# Patient Record
Sex: MALE | Race: WHITE | Employment: PART TIME | URBAN - NONMETROPOLITAN AREA
[De-identification: names, ages, dates, MRNs, and addresses within clinical notes are randomized per-mention and may not be internally consistent; named-entity substitution may affect disease eponyms.]

---
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
## 2021-02-01 DIAGNOSIS — R56.9 SEIZURES (HCC): ICD-10-CM

## 2021-02-02 RX ORDER — ZONISAMIDE 100 MG/1
100 CAPSULE ORAL 2 TIMES DAILY
Qty: 60 CAPSULE | Refills: 5 | Status: SHIPPED | OUTPATIENT
Start: 2021-02-02 | End: 2021-07-30 | Stop reason: SDUPTHER

## 2021-02-04 ENCOUNTER — OFFICE VISIT (OUTPATIENT)
Dept: NEUROLOGY | Age: 21
End: 2021-02-04
Payer: COMMERCIAL

## 2021-02-04 ENCOUNTER — HOSPITAL ENCOUNTER (OUTPATIENT)
Dept: LAB | Age: 21
Discharge: HOME | End: 2021-02-04
Payer: COMMERCIAL

## 2021-02-04 VITALS
OXYGEN SATURATION: 97 % | DIASTOLIC BLOOD PRESSURE: 72 MMHG | RESPIRATION RATE: 18 BRPM | BODY MASS INDEX: 37.22 KG/M2 | HEART RATE: 108 BPM | WEIGHT: 260 LBS | SYSTOLIC BLOOD PRESSURE: 110 MMHG | HEIGHT: 70 IN

## 2021-02-04 DIAGNOSIS — R56.9 SEIZURES (HCC): ICD-10-CM

## 2021-02-04 DIAGNOSIS — G40.209 PARTIAL SYMPTOMATIC EPILEPSY WITH COMPLEX PARTIAL SEIZURES, NOT INTRACTABLE, WITHOUT STATUS EPILEPTICUS (HCC): Primary | ICD-10-CM

## 2021-02-04 DIAGNOSIS — G40.A09 ABSENCE SEIZURE (HCC): ICD-10-CM

## 2021-02-04 DIAGNOSIS — R56.00 FEBRILE SEIZURES (HCC): ICD-10-CM

## 2021-02-04 DIAGNOSIS — G40.909 NONINTRACTABLE EPILEPSY WITHOUT STATUS EPILEPTICUS, UNSPECIFIED EPILEPSY TYPE (HCC): ICD-10-CM

## 2021-02-04 DIAGNOSIS — F81.9 LEARNING DIFFICULTY: ICD-10-CM

## 2021-02-04 DIAGNOSIS — R94.01 ABNORMAL EEG: ICD-10-CM

## 2021-02-04 DIAGNOSIS — F90.0 ATTENTION DEFICIT HYPERACTIVITY DISORDER (ADHD), PREDOMINANTLY INATTENTIVE TYPE: ICD-10-CM

## 2021-02-04 DIAGNOSIS — G40.409 TONIC CLONIC SEIZURES (HCC): ICD-10-CM

## 2021-02-04 LAB
ANION GAP SERPL CALCULATED.3IONS-SCNC: 9 MMOL/L (ref 9–17)
CHLORIDE BLD-SCNC: 103 MMOL/L (ref 98–107)
CO2: 27 MMOL/L (ref 20–31)
HCT VFR BLD CALC: 40.8 % (ref 40.7–50.3)
HEMOGLOBIN: 13.3 G/DL (ref 13–17)
MCH RBC QN AUTO: 28.9 PG (ref 25.2–33.5)
MCHC RBC AUTO-ENTMCNC: 32.6 G/DL (ref 25.2–33.5)
MCV RBC AUTO: 88.7 FL (ref 82.6–102.9)
NRBC AUTOMATED: 0 PER 100 WBC
PDW BLD-RTO: 13.7 % (ref 11.8–14.4)
PLATELET # BLD: 236 K/UL (ref 138–453)
PMV BLD AUTO: 10 FL (ref 8.1–13.5)
POTASSIUM SERPL-SCNC: 4.2 MMOL/L (ref 3.7–5.3)
RBC # BLD: 4.6 M/UL (ref 4.21–5.77)
SODIUM BLD-SCNC: 139 MMOL/L (ref 135–144)
VALPROIC ACID LEVEL: 54 UG/ML (ref 50–125)
VALPROIC DATE LAST DOSE: NORMAL
VALPROIC DOSE AMOUNT: NORMAL
VALPROIC TIME LAST DOSE: 630
WBC # BLD: 11.3 K/UL (ref 4.5–13.5)

## 2021-02-04 PROCEDURE — 36415 COLL VENOUS BLD VENIPUNCTURE: CPT

## 2021-02-04 PROCEDURE — 80164 ASSAY DIPROPYLACETIC ACD TOT: CPT

## 2021-02-04 PROCEDURE — 80051 ELECTROLYTE PANEL: CPT

## 2021-02-04 PROCEDURE — 85027 COMPLETE CBC AUTOMATED: CPT

## 2021-02-04 PROCEDURE — 1036F TOBACCO NON-USER: CPT | Performed by: PSYCHIATRY & NEUROLOGY

## 2021-02-04 PROCEDURE — 99214 OFFICE O/P EST MOD 30 MIN: CPT

## 2021-02-04 PROCEDURE — G8484 FLU IMMUNIZE NO ADMIN: HCPCS | Performed by: PSYCHIATRY & NEUROLOGY

## 2021-02-04 PROCEDURE — G8417 CALC BMI ABV UP PARAM F/U: HCPCS | Performed by: PSYCHIATRY & NEUROLOGY

## 2021-02-04 PROCEDURE — 99215 OFFICE O/P EST HI 40 MIN: CPT | Performed by: PSYCHIATRY & NEUROLOGY

## 2021-02-04 PROCEDURE — G8427 DOCREV CUR MEDS BY ELIG CLIN: HCPCS | Performed by: PSYCHIATRY & NEUROLOGY

## 2021-02-04 RX ORDER — DIVALPROEX SODIUM 500 MG/1
TABLET, DELAYED RELEASE ORAL
Qty: 60 TABLET | Refills: 5 | Status: SHIPPED | OUTPATIENT
Start: 2021-02-04 | End: 2021-08-06 | Stop reason: SDUPTHER

## 2021-02-04 ASSESSMENT — ENCOUNTER SYMPTOMS
VOMITING: 0
CHANGE IN BOWEL HABIT: 0
EYES NEGATIVE: 1
COUGH: 0
ALLERGIC/IMMUNOLOGIC NEGATIVE: 1
RESPIRATORY NEGATIVE: 1
VISUAL CHANGE: 0
SWOLLEN GLANDS: 0
SORE THROAT: 0
NAUSEA: 0
DIARRHEA: 0
GASTROINTESTINAL NEGATIVE: 1
ABDOMINAL PAIN: 0

## 2021-02-04 NOTE — PROGRESS NOTES
Subjective:      Patient ID: Candy Moscoso is a 24 y.o. RIGHT   HANDED   . Seizures  This is a chronic problem. Episode onset: IN  CHILD [facility]AND[place]. The problem has been resolved. Pertinent negatives include no abdominal pain, anorexia, arthralgias, change in bowel habit, chest pain, chills, congestion, coughing, diaphoresis, fatigue, fever, headaches, joint swelling, myalgias, nausea, neck pain, numbness, rash, sore throat, swollen glands, urinary symptoms, vertigo, visual change, vomiting or weakness. Nothing aggravates the symptoms. Treatments tried: ANTI  EPILEPTIC MEDICATIONS. The treatment provided significant relief. History obtained from  The patient and  HIS  MOTHER       and other  available medical records were  Also  reviewed.               1)      H/O  KNOWN  CHILDHOOD ONSET  EPILEPSY                           -   STABLE          2)       PREVIOUS    H/O    FEBRILE SEIZURES                       -  NO  RECURRENCE                3)        PATIENT'S    SEIZURE  TYPES  INCLUDE :                  GTC  SEIZURES,  STARING  EPISODES,                 ABSENCE  AND  PARTIAL  COMPLEX  SEIZURES                      -     WELL  CONTROLLED                4)       FAMILY   H/O  SEIZURE  DISORDER/  EPILEPSY          5)        H/O  ADHD     -   STABLE                PATIENT  TO  FOLLOW  WITH  MENTAL  HEALTH  PROFESSIONALS              6)     H/O   LEARNING  DIFFICULTY                      -  IMPROVED                    PATIENT   COMPLETED       12  TH  GRADE                            VOCATIONAL  EDUCATION          7)       PREVIOUS  H/O  BEHAVIORAL  PROBLEMS                    -  WELL  CONTROLLED          8)     LAST  SEIZURE  ACTIVITY   REPORTED  TO  BE  IN  December 2014          9)   CURRENTLY   ON  DEPAKOTE  AND  ZONEGRAN             TOLERATING  THE SAME  WITHOUT  ANY  SIDE  EFFECTS            10)         PATIENT  NEUROLOGICALLY   STABLE                      WITHOUT  ANY  RECURRENCE  OF SEIZURE ACTIVITY. NO  MEMORY PROBLEMS. NO  CONFUSIONAL EPISODES. NO   STARING  EPISODES          11)       EEG   IN  December 2017    SHOWED                   NO  EPILEPTIFORM  FEATURES. 12)       PATIENT     GOT     DRIVERS  LICENSE   PERMIT  IN    8/87/1698                      PATIENT      SHOULD   CONTINUE  :                    A)    ANTI  EPILEPTIC  MEDICATIONS  PROPERLY,                     B)    FOLLOW  SEIZURE  PRECAUTIONS                         -   DISCUSSED   WITH   PATIENT  AND  HIS  MOTHER                                     13)     PATIENT  AND  HIS  MOTHER   DENIES  ANY                     SEIZURE   RECURRENCE      AND   ANY    NEW                         NEUROLOGICAL    CONCERNS. The  Duration,  Quality,  Severity,  Location,  Timing,  Context,  Modifying  Factors   Of   The   Chief   Complaint       And  Present  Illness   Was   Reviewed   In   Chronological   [facility].                                        Patient   Indicates   The  Presence   And  The  Absence  Of  The  Following  Associated  And         Additional  Neurological    Symptoms:                                Balance  And coordination problems  absent           Gait problems     absent            Headaches      absent              Migraines           absent           Memory problems        Absent             Confusion        absent            Paresthesia numbness          absent           Seizures  And  Starring  Episodes           present           Syncope,  Near  syncopal episodes         absent           Speech problems           absent             Swallowing  Problems      absent            Dizziness,  Light headedness           absent                        Vertigo        absent             Generalized   Weakness    absent              focal  Weakness     absent             Tremors         absent              Sleep  Problems     absent History  Of   Recent   Head  Injury     absent             History  Of   Recent  TIA     absent             History  Of   Recent    Stroke     absent             Neck  Pain and  Neck muscle  Spasms  Absent               Radiating  down   And   Weakness           absent            Lower back   Pain  And     Spasms  Absent              Radiating    Down   And   Weakness          absent                H/O   FALLS        absent               History  Of   Visual  Symptoms    Absent                  Associated   Diplopia       absent                                    Also   Additional   Symptoms   Present    As  Documented    In   The detailed      Review  Of  Systems   And    Please   Refer   To    Them for   Additional  Information. Any components  That are either  Unobtainable  Or  Limited  In   HPI, ROS  And/or PFSH   Are       Due   To   Patient's  Medical  Problems,  Clinical  Condition and/or lack of other  Alternate resources.             RECORDS   REVIEWED:    historical medical records, lab reports, office notes and radiology reports           INFORMATION   REVIEWED:     MEDICAL   HISTORY,     SURGICAL   HISTORY,   MEDICATIONS   LIST,   ALLERGIES AND  DRUG  INTOLERANCES,     FAMILY   HISTORY,  SOCIAL  HISTORY,    PROBLEM  LIST   FOR  PATIENT  CARE   COORDINATION             Past Medical History:   Diagnosis Date    Abnormal EEG     [facility]     Attention deficit hyperactivity disorder     Behavioral problems     Complex partial [facility]ptic seizure [facility] [facility]     Epile[facility] [facility]     Febrile seizures ([facility] [address] [facility] last seizure in July 2010    Learning difficulty     Tonic clonic seizures ([facility] [address] [facility]                   Past Surgical History:   Procedure Laterality Date    TONSILLECTOMY AND ADENOIDECTOMY  2003                 Current Outpatient Medications   Medication Sig Dispense Refill    vitamin D (CHOLECALCIFEROL) 25 MCG (1000 UT) TABS tablet Take 1,000 Units by mouth daily      divalproex (DEPAKOTE) 500 MG DR tablet ONE TABLET     TWICE  DAILY 60 tablet 5    zonisamide (ZONEGRAN) 100 MG capsule Take 1 capsule by mouth 2 times daily 60 capsule 5    Multiple Vitamins-Minerals (MULTIVITAL) CHEW Take  by mouth daily.  loratadine (CLARITIN) 10 MG tablet Take 10 mg by mouth as needed. No current facility-administered medications for this visit.             No Known Allergies            Family History   Problem Relation Age of Onset    Depression Mother     Asthma Mother     Neuropathy Mother     Mental Illness Mother     Seizures Neg Hx              Social History     Socioeconomic History    Marital status: Single     Spouse name: Not on file    Number of children: Not on file    Years of education: Not on file    Highest education level: Not on file   Occupational History    Not on file   Social Needs    Financial resource strain: Not on file    Food insecurity     Worry: Not on file     Inability: Not on file    Transportation needs     Medical: Not on file     Non-medical: Not on file   Tobacco Use    Smoking status: Never Smoker    Smokeless tobacco: Never Used   Substance and Sexual Activity    Alcohol use: No     Alcohol/week: 0.0 standard drinks    Drug use: Never    Sexual activity: Not on file   Lifestyle    Physical activity     Days per week: Not on file     Minutes per session: Not on file    Stress: Not on file   Relationships    Social connections     Talks on phone: Not on file     Gets together: Not on file     Attends Taoism service: Not on file     Active member of club or organization: Not on file     Attends meetings of clubs or organizations: Not on file     Relationship status: Not on file    Intimate partner violence     Fear of current or ex partner: Not on file     Emotionally abused: Not on file     Physically abused: Not on file     Forced sexual activity: Not on file   Other Topics Concern    Not on file   Social History Narrative    Not on file       Vitals:    02/04/21 1554   BP: 110/72   Pulse: (!) 108   Resp: 18   SpO2: 97%         Wt Readings from Last 3 Encounters:   02/04/21 (!) 260 lb (117.9 kg) (>99 %, Z= 2.57)*   07/24/20 (!) 243 lb 12.8 oz (110.6 kg) (>99 %, Z= 2.34)*   11/07/19 (!) 231 lb 6.4 oz (105 kg) (99 %, Z= 2.20)*     * Growth percentiles are based on [facility] (Boys, 2-20 Years) data. BP Readings from Last 3 Encounters:   02/04/21 110/72   07/24/20 124/82   11/07/19 120/74 (53 %, Z = 0.07 /  68 %, Z = 0.47)*     *BP percentiles are based on the 2017 AAP Clinical Practice Guideline for boys       Hematology and Coagulation  Lab Results   Component Value Date    WBC 9.5 07/24/2020    RBC 4.76 07/24/2020    HGB 13.9 07/24/2020    HCT 42.4 07/24/2020    MCV 89.1 07/24/2020    MCH 29.2 07/24/2020    MCHC 32.8 07/24/2020    RDW 13.4 07/24/2020     07/24/2020    MPV 10.6 07/24/2020       Chemistries  Lab Results   Component Value Date     07/24/2020    K 4.5 07/24/2020     07/24/2020    CO2 22 07/24/2020    BUN 16 10/16/2018    CREATININE 1.09 10/16/2018    AST 22 03/05/2019    ALT 31 03/05/2019     Lab Results   Component Value Date    ALT 31 03/05/2019    AST 22 03/05/2019     Lab Results   Component Value Date    AST 22 03/05/2019    ALT 31 03/05/2019     Lab Results   Component Value Date    MTQAXOEN11 1066 09/29/2016         Drug Levels  Lab Results   Component Value Date    VALPROATE 62 07/24/2020    VALPROATE 58 08/05/2019           Review of Systems   Constitutional: Negative for chills, diaphoresis, fatigue and fever. HENT: Negative. Negative for congestion and sore throat. Eyes: Negative. Respiratory: Negative. Negative for cough. Cardiovascular: Negative. Negative for chest pain and palpitations. Gastrointestinal: Negative. Negative for abdominal pain, anorexia, change in bowel habit, diarrhea, nausea and vomiting. Endocrine: Negative. Genitourinary: Negative. Musculoskeletal: Negative. Negative for arthralgias, joint swelling, myalgias and neck pain. Skin: Negative. Negative for rash. Allergic/Immunologic: Negative. Neurological: Positive for seizures. Negative for dizziness, vertigo, tremors, syncope, facial asymmetry, speech difficulty, weakness, light-headedness, numbness and headaches. Hematological: Negative. Psychiatric/Behavioral: Positive for behavioral problems and decreased concentration. Negative for agitation, confusion, dysphoric mood, hallucinations, self-injury and suicidal ideas. The patient is not nervous/anxious. Objective:   Physical Exam  Constitutional:       Appearance: He is well-developed. HENT:      Head: Normocephalic and atraumatic. No raccoon eyes or Calixto's sign. Right Ear: External ear normal.      Left Ear: External ear normal.      Nose: Nose normal.   Eyes:      Conjunctiva/sclera: Conjunctivae normal.      Pupils: Pupils are equal, round, and reactive to light. Neck:      Musculoskeletal: Normal range of motion and neck supple. Normal range of motion. No neck rigidity or muscular tenderness. Thyroid: No thyroid mass or thyromegaly. Vascular: No carotid bruit. Trachea: No tracheal deviation. Meningeal: Brudzinski's sign and Kernig's sign absent. Cardiovascular:      Rate and Rhythm: Normal rate and regular rhythm. Pulmonary:      Effort: Pulmonary effort is normal.   Musculoskeletal:         General: No tenderness. Skin:     General: Skin is warm. Coloration: Skin is not pale. Findings: No erythema or rash. Nails: There is no clubbing. Psychiatric:         Attention and Perception: He is attentive. Mood and Affect: Mood is not anxious or depressed. Affect is not labile, blunt or inappropriate. Speech: He is communicative.  Speech is not rapid and pressured, delayed, slurred or tangential.         Behavior: Behavior is not agitated, slowed, aggressive, withdrawn, hyperactive or combative. Behavior is cooperative. Thought Content: Thought content is not paranoid or delusional. Thought content does not include homicidal or suicidal ideation. Thought content does not include homicidal or suicidal plan. Cognition and Memory: Memory is not impaired. He does not exhibit impaired recent memory or impaired remote memory. Judgment: Judgment is not impulsive or inappropriate. NEUROLOGICAL EXAMINATION :    A) MENTAL STATUS:                   Alert and  oriented  To time, place  And  Person. No Aphasia. No  Dysarthria. Able   To  Follow   SIMPLE      commands without   Any  Difficulty. No right  To left confusion. Normal  Speech  And language function. Insight and  Judgment ,Fund  Of  Knowledge   within normal limits                Recent  And  Remote memory  within normal limits                Attention &Concentration are within normal limits                                                     B) CRANIAL NERVES :             2 CN : Visual  Acuity  And  Visual fields  within normal limits                        Fundi  Could  Not  Be  Could  Not  Be  Evaluated. 3,4,6 CN : Both  Pupils are   Reactive and  Equal.                            Extraocular   Movements  Are  Intact. No  Nystagmus. No  DELMY. No  Afferent  Pupillary  Defect noted. 5 CN :  Normal  Facial sensations and Corneal  Reflexes           7 CN :  Normal  Facial  Symmetry  And  Strength. No facial  Weakness.            8 CN :  Hearing  Appears within normal limits          9, 10 CN: Normal spontaneous, reflex palate movements         11 CN:   Normal  Shoulder shrug and  Strength         12 CN :   Normal  Tongue movements and  Tongue  In midline                        No tongue   Fasciculations or atrophy           C) MOTOR  EXAM: Strength  In upper  And  Lower extremities   within normal limits               No  Drift. No  Atrophy               Rapid alternating  And  repetitions  Movements  within normal limits               Muscle  Tone  In upper  And  Lower  Extremities  Normal                No rigidity. No  Spasticity. Bradykinesia   Absent                 No  Asterixis. Sustention  Tremor , Resting  Tremor   absent                    No other  Abnormal  Movements noted           D) SENSORY :               light touch, pinprick, position  And  Vibration  within normal limits        E) REFLEXES:                   Deep  Tendon  Reflexes normal                    No pathological  Reflexes  Bilaterally. F) COORDINATION  AND  GAIT :                                Station and  Gait  normal                                        Romberg's test negative                          Ataxia negative      Assessment:      Patient Active Problem List   Diagnosis    Attention deficit hyperactivity disorder    Febrile seizures ([address]    Abnormal EEG    Tonic clonic seizures ([address]    Behavioral problems    Epilepsy ([address]    Learning difficulty    Complex partial epileptic seizure ([address]    [facility]ence seizure [facility] [address]           Plan:           VISITING DIAGNOSIS:      ICD-10-CM    1. Partial symptomatic epilepsy with complex partial seizures, not intractable, without status epilepticus ([address]  G40.209    2. Seizures (HCC)  R56.9 divalproex (DEPAKOTE) 500 MG DR tablet     CBC     Electrolyte Panel     Valproic acid level, total   3. Learning difficulty  F81.9    4. Attention deficit hyperactivity disorder (ADHD), predominantly inattentive type  F90.0    5. Absence seizure ([address]  G40. A09    6. Nonintractable epilepsy without status epilepticus, unspecified epilepsy type ([address]  G40.909    7. Tonic clonic seizures (HCC)  G40.409    8. Abnormal EEG  R94.01    9.  Febrile seizures ([address] R56.00               CONCERNS   &   INCREASED   RISK   FOR           *  SEIZURE  ACTIVITY,  EPILEPSY           *        VARIOUS  RISK   FACTORS   WERE  REVIEWED   AND   DISCUSSED. *  PATIENT   HAS  MULTIPLE   MEDICAL, MENTAL HEALTH  &    NEUROLOGICAL   PROBLEMS . PATIENT'S   MANAGEMENT  IS  CHALLENGING. PLAN:       Jason Agarwal  Of  The  Diagnoses,  The  Management & Treatment  Options           AND    Care  plan  Were        Reviewed and   Discussed   With  patient and   HIS  MOTHER    . * Goals  And  Expectations  Of  The  Therapy  Discussed   And  Reviewed. *   Benefits   And   Side  Effect  Profile  Of  Medication/s   Were   Discussed             * Need   For  Further   Follow up For  The  Various  Problems  Were discussed. * Results  Of  The  Previous  Diagnostic tests were reviewed and questions answered. patient and father  understand the same. Medical  Decision  Making  Was  Made  Based on the   Complexity  Of  Above  Mentioned  Diagnoses,        Data reviewed   & diagnostic  Tests  Reviewed,  Risk  Of  Significant   Co morbidities and complicating   Factors. Medical  Decision  Was   High  Complexity  Due   To  The  Patient's  Multiple  Symptoms,  Advancing   Disease,      Complex  Treatment  Regimen,  Multiple medications and   Risk  Of   Side  Effects,  Difficulty  In  Medication  Management      And  Diagnostic  Challenges   In  View  Of  The  Associated   Co  Morbid  Conditions   And  Problems. *    SUPERVISED   CARE      *   BE  CAREFUL  WITH  ACTIVITIES             *   ADEQUATE   FLUID  INTAKE   AND  ELECTROLYTE  BALANCE             * KEEP  DAIRY  OF   THE  NEUROLOGICAL  SYMPTOMS        RECORDING THE    DURATION  AND  FREQUENCY. *  AVOID    CONDITIONS  AND  FACTORS   THAT  MAKE   NEUROLOGICAL  SYMPTOMS  WORSE. *   SEIZURE  PRECAUTIONS. A)  Avoid  Working  At   [employer] B)  Avoid  Working  With  Heavy machinery. C)  Avoid   Swimming,  Climbing  A  Ladder   Unattended. D)  Avoid   Driving   If  You   Have  A  Seizure. E)  Must   Be  Seizure  Free   For  At   Least   6 months,  Before   You  Can drive. F) Some times  Your  May  Feel  Seizure coming  Before  It  Begins. You  May feel               Strange smell or funny  Feeling  In  Your  Stomach,  Which is  Called   Aura. TIPS  TO  REDUCE/ PREVENT  SEIZURES         1. Take  Your  Anti seizure  Medications   As   Recommended. 2. Get   Enough   Sleep. Sleep  Deprivation   Can  Trigger  A  Seizure. 3. If   You   Have  A fever,  Treat  It  At  Once,  And  Contact   Your  Primary  Care Providers. 4. Avoid   Alcohol. 5. Avoid  Flashing  Lights,  Loud  Noises and  TV  And  Video  Games,             As   These  May  Trigger   Your  Seizures       6. Control  Your  Stress  And   Have  Adequate  Rest.       7.   If  You  Feel  A  Seizure  Coming   On :             A) warn people  Who  Are  With  You           B)  Make  Sure  There  Are  No  Sharp or  Hard  Objects  Around you. C)  Lay down  On  Your  Side  And  Relax. *  TO  MAINTAIN  REGULAR  SLEEP  WAKE  CYCLES. *   TO  HAVE  ADEQUATE  REST  AND   SLEEP    HOURS. *      WEIGHT   LOSS. *    AVOID  ANY USAGE OF                   TOBACCO,  EXCESSIVE  ALCOHOL  AND   ILLEGAL   SUBSTANCES          *  CONTINUE MEDICATIONS PRESCRIBED      AS    RECOMMENDED       *   Compliance   With  Medications   And  Instructions          * CURRENTLY  TOLERATING  THE  PRESCRIBED   MEDICATIONS.        WITHOUT  ANY  SIGNIFICANT  SIDE  EFFECTS &  GETTING BENEFIT. *  May   Use  Pill  Box,    If  Needed        *  MEDICATIONS TO AVOID  :    WELLBUTRIN,  ULTRAM         *    Prophylactic  Use   Of     Vitamin   B   Complex,  Folic  Acid,    Vitamin  B12        Multivitamin   Tablet  Daily    Supplementations   Over  The  Counter  Discussed           *  PATIENT  IS  ALSO   COUNSELED   TO  KEEP    ACTIVITIES         A)   SIMPLE      B)  ORGANIZED      C)  WRITE   DOWN                      Orders Placed This Encounter   Procedures    CBC    Electrolyte Panel    Valproic acid level, total       Orders Placed This Encounter   Medications    divalproex (DEPAKOTE) 500 MG DR tablet     Sig: ONE TABLET     TWICE  DAILY     Dispense:  60 tablet     Refill:  5                   *PATIENT   TO  FOLLOW  UP  WITH   PRIMARY  CARE   AND   OTHER  CONSULTANTS  AS  BEFORE.           *TO  FOLLOW  WITH   MENTAL  HEALTH  PROFESSIONALS ,  INCLUDING            PSYCHOLOGICAL  COUNSELING   AND  PSYCHIATRIC  EVALUTIONS            *  Maintain   Healthy  Life Style    With   Periodic  Monitoring  Of      Any  Medical  Conditions  Including   Elevated  Blood  Pressure,  Lipid  Profile,     Blood  Sugar levels  And   Heart  Disease. *   Period   Screening  For  Cancers  Involving  Breast,  Colon,    Prostate, lungs  And  Other  Organs  As  Applicable,  In consultation   With  Your  Primary Care Providers. * Second  Neurological  Opinion  And  Evaluations  In  [facility] AND [facility]  Setting  If  Patient  Is  Interested.                   *  If  The  Patient remains  Neurologically  Stable   Return   To  [facility] Neurology Department       IN  3-6   MONTHS  TIME   FOR  FURTHER  FOLLOW UP.                   *  If   There is  Any  Significant  Worsening   Of  Current  Symptoms  And  Or  If patient  Develops       Any additional  New  Neurological  Symptoms  Or  Significant  Concerns   Should  Call  911 or      Go  To  Emergency Department  For  Further  Immediate  Evaluation. *   The  Neurological   Findings,  Possible  Diagnosis,  Differential diagnoses   And  Options      For    Further   Investigations   And  management   Are  Discussed  Comprehensively. Medications   And  Prescription   Risks  And  Side effects  Are   Also  Discussed. The  Above  Were  Reviewed  With  patient and   HIS  MOTHER    And     questions  Answered  In  Detail. More   Than   50% of face  To face Time   Was  Spent  On  Counseling   And   Coordination  Of  Care       Of   Patient's multiple   Neurological  Problems   And   Comorbid  Medical   Conditions. Electronically signed by Shannan Casillas MD.  [facility]      Board Certified in  Neurology &  In  [facility] of Psychiatry and Neurology ([employer])      DISCLAIMER:   Although every effort was made to ensure the accuracy of this  electronic transcription, some errors in transcription may have occurred. GENERAL PATIENT INSTRUCTIONS:     A Healthy Lifestyle: Care Instructions  Your Care Instructions  A healthy lifestyle can help you feel good, stay at a healthy weight, and have plenty of energy for both work and play. A healthy lifestyle is something you can share with your whole family. A healthy lifestyle also can lower your risk for serious health problems, such as high blood pressure, heart disease, and diabetes. You can follow a few steps listed below to improve your health and the health of your family. Follow-up care is a key part of your treatment and safety. Be sure to make and go to all appointments, and call your doctor if you are having problems. Its also a good idea to know your test results and keep a list of the medicines you take. How can you care for yourself at home? Do not eat too much sugar, fat, or fast foods. You can still have dessert and treats now and then. The goal is moderation.   Start small to improve your eating habits. Pay attention to portion sizes, drink less juice and soda pop, and eat more fruits and vegetables. Eat a healthy amount of food. A 3-ounce serving of meat, for example, is about the size of a deck of cards. Fill the rest of your plate with vegetables and whole grains. Limit the amount of soda and sports drinks you have every day. Drink more water when you are thirsty. Eat at least 5 servings of fruits and vegetables every day. It may seem like a lot, but it is not hard to reach this goal. A serving or helping is 1 piece of fruit, 1 cup of vegetables, or 2 cups of leafy, raw vegetables. Have an apple or some carrot sticks as an afternoon snack instead of a candy bar. Try to have fruits and/or vegetables at every meal.  Make exercise part of your daily routine. You may want to start with simple activities, such as walking, bicycling, or slow swimming. Try to be active 30 to 60 minutes every day. You do not need to do all 30 to 60 minutes all at once. For example, you can exercise 3 times a day for 10 or 20 minutes. Moderate exercise is safe for most people, but it is always a good idea to talk to your doctor before starting an exercise program.  Keep moving. Jerilyn Antu the lawn, work in the garden, or [employer]. Take the stairs instead of the elevator at work. If you smoke, quit. People who smoke have an increased risk for heart attack, stroke, cancer, and other lung illnesses. Quitting is hard, but there are ways to boost your chance of quitting tobacco for good. Use nicotine gum, patches, or lozenges. Ask your doctor about stop-smoking programs and medicines. Keep trying. In addition to reducing your risk of diseases in the future, you will notice some benefits soon after you stop using tobacco. If you have shortness of breath or asthma symptoms, they will likely get better within a few weeks after you quit. Limit how much alcohol you drink.  Moderate amounts of alcohol (up to 2 drinks a day for men, 1 drink a day for women) are okay. But drinking too much can lead to liver problems, high blood pressure, and other health problems. Family health  If you have a family, there are many things you can do together to improve your health. Eat meals together as a family as often as possible. Eat healthy foods. This includes fruits, vegetables, lean meats and dairy, and whole grains. Include your family in your fitness plan. Most people think of activities such as jogging or tennis as the way to fitness, but there are many ways you and your family can be more active. Anything that makes you breathe hard and gets your heart pumping is exercise. Here are some tips:  Walk to do errands or to take your child to school or the bus. Go for a family bike ride after dinner instead of watching TV. Where can you learn more? Go to [email or web address] org and sign in to your [facility] account. Enter Q012 in the [employer] box to learn more about \"A Healthy Lifestyle: Care Instructions. \"     If you do not have an account, please click on the \"Sign Up Now\" link. Current as of: July 26, 2016  Content Version: 11.2  © 9215-5210 [employer]. Care instructions adapted under license by [facility] ([facility]). If you have questions about a medical condition or this instruction, always ask your healthcare professional. [place] [address] any warranty or liability for your use of this information.

## 2021-02-04 NOTE — PATIENT INSTRUCTIONS
*   SEIZURE  PRECAUTIONS. A)  Avoid  Working  At   [employer] B)  Avoid  Working  With  Heavy machinery. C)  Avoid   Swimming,  Climbing  A  Ladder   Unattended. D)  Avoid   Driving   If  You   Have  A  Seizure. E)  Must   Be  Seizure  Free   For  At   Least   6 months,  Before   You  Can drive. F) Some times  Your  May  Feel  Seizure coming  Before  It  Begins. You  May feel             Strange smell or funny  Feeling  In  Your  Stomach,  Which is  Called   Aura. TIPS  TO  REDUCE/ PREVENT  SEIZURES         1. Take  Your  Anti seizure  Medications   As   Recommended. 2. Get   Enough   Sleep. Sleep  Deprivation   Can  Trigger  A  Seizure. 3. If   You   Have  A fever,  Treat  It  At  Once,  And  Contact   Your  Primary  Care Providers. 4. Avoid   Alcohol. 5. Avoid  Flashing  Lights,  Loud  Noises and  TV  And  Video  Games,           As   These  May  Trigger   Your  Seizures       6. Control  Your  Stress  And   Have  Adequate  Rest.       7.   If  You  Feel  A  Seizure  Coming   On :           A) warn people  Who  Are  With  You           B)  Make  Sure  There  Are  No  Sharp or  Hard  Objects  Around you. C)  Lay down  On  Your  Side  And  Relax. *   ADEQUATE   FLUID  INTAKE   AND  ELECTROLYTE  BALANCE           * KEEP  DAIRY  OF   THE  NEUROLOGICAL  SYMPTOMS          *  TO  MAINTAIN  REGULAR  SLEEP  WAKE  CYCLES.      *   TO  HAVE  ADEQUATE  REST  AND   SLEEP    HOURS.        *    AVOID  USAGE OF   TOBACCO,  EXCESSIVE  ALCOHOL                AND   ILLEGAL   SUBSTANCES,  IF  ANY *  Maintain   Healthy  Life Style    With   Periodic  Monitoring  Of      Any  Medical  Conditions  Including   Elevated  Blood  Pressure,  Lipid  Profile,     Blood  Sugar levels  And   Heart  Disease. *   Period   Screening  For  Cancers  Involving  Breast,  Colon,   lungs  And  Other  Organs  As  Applicable,  In consultation   With  Your  Primary Care Providers. *  If   There is  Any  Significant  Worsening   Of  Current  Symptoms  And  Or  If    Any additional  New  Neurological  Symptoms                 Or  Significant  Concerns   Should  Call  911 or  Go  To  Emergency  Department  For  Further  Immediate  Evaluation.

## 2021-05-20 NOTE — TELEPHONE ENCOUNTER
Last Appt:  11/7/2019  Next Appt:   7/2/2020  Med verified in [facility]      Patient needs refill of zonisamide 100mg
error.
Detail Level: Simple
Comment: Hormonal acne, mild on exam- will continue Spironolactone 50mg PO QD. Will start tretinoin 0.025% cream QHS as tolerated. Follow up in 6 months.
Render Risk Assessment In Note?: no

## 2021-07-30 DIAGNOSIS — R56.9 SEIZURES (HCC): ICD-10-CM

## 2021-07-30 RX ORDER — ZONISAMIDE 100 MG/1
100 CAPSULE ORAL 2 TIMES DAILY
Qty: 60 CAPSULE | Refills: 5 | Status: SHIPPED | OUTPATIENT
Start: 2021-07-30 | End: 2021-10-07 | Stop reason: SDUPTHER

## 2021-07-30 NOTE — TELEPHONE ENCOUNTER
Last Appt:  2/4/2021  Next Appt:   8/6/2021  Med verified in Epic    Patient needs refill on [employer]gran

## 2021-08-06 ENCOUNTER — OFFICE VISIT (OUTPATIENT)
Dept: NEUROLOGY | Age: 21
End: 2021-08-06
Payer: COMMERCIAL

## 2021-08-06 ENCOUNTER — HOSPITAL ENCOUNTER (OUTPATIENT)
Dept: LAB | Age: 21
Discharge: HOME | End: 2021-08-06
Payer: COMMERCIAL

## 2021-08-06 VITALS
HEIGHT: 70 IN | BODY MASS INDEX: 38.8 KG/M2 | HEART RATE: 109 BPM | SYSTOLIC BLOOD PRESSURE: 108 MMHG | WEIGHT: 271 LBS | OXYGEN SATURATION: 94 % | DIASTOLIC BLOOD PRESSURE: 72 MMHG

## 2021-08-06 DIAGNOSIS — G40.409 OTHER GENERALIZED EPILEPSY, NOT INTRACTABLE, WITHOUT STATUS EPILEPTICUS (HCC): ICD-10-CM

## 2021-08-06 DIAGNOSIS — R94.01 ABNORMAL EEG: ICD-10-CM

## 2021-08-06 DIAGNOSIS — F81.9 LEARNING DIFFICULTY: ICD-10-CM

## 2021-08-06 DIAGNOSIS — R56.00 FEBRILE SEIZURES (HCC): ICD-10-CM

## 2021-08-06 DIAGNOSIS — F90.0 ATTENTION DEFICIT HYPERACTIVITY DISORDER (ADHD), PREDOMINANTLY INATTENTIVE TYPE: ICD-10-CM

## 2021-08-06 DIAGNOSIS — G40.409 TONIC CLONIC SEIZURES (HCC): ICD-10-CM

## 2021-08-06 DIAGNOSIS — R56.9 SEIZURES (HCC): ICD-10-CM

## 2021-08-06 DIAGNOSIS — G40.A09 ABSENCE SEIZURE (HCC): ICD-10-CM

## 2021-08-06 DIAGNOSIS — G40.209 PARTIAL SYMPTOMATIC EPILEPSY WITH COMPLEX PARTIAL SEIZURES, NOT INTRACTABLE, WITHOUT STATUS EPILEPTICUS (HCC): Primary | ICD-10-CM

## 2021-08-06 DIAGNOSIS — G40.209 PARTIAL SYMPTOMATIC EPILEPSY WITH COMPLEX PARTIAL SEIZURES, NOT INTRACTABLE, WITHOUT STATUS EPILEPTICUS (HCC): ICD-10-CM

## 2021-08-06 LAB
ANION GAP SERPL CALCULATED.3IONS-SCNC: 11 MMOL/L (ref 9–17)
CHLORIDE BLD-SCNC: 106 MMOL/L (ref 98–107)
CO2: 25 MMOL/L (ref 20–31)
HCT VFR BLD CALC: 42.4 % (ref 40.7–50.3)
HEMOGLOBIN: 13.9 G/DL (ref 13–17)
MCH RBC QN AUTO: 28.8 PG (ref 25.2–33.5)
MCHC RBC AUTO-ENTMCNC: 32.8 G/DL (ref 25.2–33.5)
MCV RBC AUTO: 87.8 FL (ref 82.6–102.9)
NRBC AUTOMATED: 0 PER 100 WBC
PDW BLD-RTO: 13.7 % (ref 11.8–14.4)
PLATELET # BLD: 248 K/UL (ref 138–453)
PMV BLD AUTO: 10.1 FL (ref 8.1–13.5)
POTASSIUM SERPL-SCNC: 4 MMOL/L (ref 3.7–5.3)
RBC # BLD: 4.83 M/UL (ref 4.21–5.77)
SODIUM BLD-SCNC: 142 MMOL/L (ref 135–144)
VALPROIC ACID LEVEL: 73 UG/ML (ref 50–125)
VALPROIC DATE LAST DOSE: NORMAL
VALPROIC DOSE AMOUNT: NORMAL
VALPROIC TIME LAST DOSE: 800
WBC # BLD: 7.9 K/UL (ref 4.5–13.5)

## 2021-08-06 PROCEDURE — 36415 COLL VENOUS BLD VENIPUNCTURE: CPT

## 2021-08-06 PROCEDURE — 85027 COMPLETE CBC AUTOMATED: CPT

## 2021-08-06 PROCEDURE — G8427 DOCREV CUR MEDS BY ELIG CLIN: HCPCS | Performed by: PSYCHIATRY & NEUROLOGY

## 2021-08-06 PROCEDURE — 99214 OFFICE O/P EST MOD 30 MIN: CPT | Performed by: PSYCHIATRY & NEUROLOGY

## 2021-08-06 PROCEDURE — 80164 ASSAY DIPROPYLACETIC ACD TOT: CPT

## 2021-08-06 PROCEDURE — G8417 CALC BMI ABV UP PARAM F/U: HCPCS | Performed by: PSYCHIATRY & NEUROLOGY

## 2021-08-06 PROCEDURE — 80051 ELECTROLYTE PANEL: CPT

## 2021-08-06 PROCEDURE — 1036F TOBACCO NON-USER: CPT | Performed by: PSYCHIATRY & NEUROLOGY

## 2021-08-06 PROCEDURE — 80203 DRUG SCREEN QUANT ZONISAMIDE: CPT

## 2021-08-06 RX ORDER — DIVALPROEX SODIUM 500 MG/1
TABLET, DELAYED RELEASE ORAL
Qty: 60 TABLET | Refills: 5 | Status: SHIPPED | OUTPATIENT
Start: 2021-08-06 | End: 2021-10-07 | Stop reason: SDUPTHER

## 2021-08-06 ASSESSMENT — ENCOUNTER SYMPTOMS
SWOLLEN GLANDS: 0
ALLERGIC/IMMUNOLOGIC NEGATIVE: 1
NAUSEA: 0
SORE THROAT: 0
VOMITING: 0
ABDOMINAL PAIN: 0
GASTROINTESTINAL NEGATIVE: 1
CHANGE IN BOWEL HABIT: 0
COUGH: 0
EYES NEGATIVE: 1
RESPIRATORY NEGATIVE: 1
DIARRHEA: 0
VISUAL CHANGE: 0

## 2021-08-06 NOTE — PROGRESS NOTES
ACTIVITY. NO  MEMORY PROBLEMS. NO  CONFUSIONAL EPISODES. NO   STARING  EPISODES          11)       EEG   IN  December 2017    SHOWED                   NO  EPILEPTIFORM  FEATURES. 12)       PATIENT     GOT     DRIVERS  LICENSE   PERMIT  IN    5/72/9932                        PATIENT      SHOULD   CONTINUE  :                    A)    ANTI  EPILEPTIC  MEDICATIONS  PROPERLY,                     B)    FOLLOW  SEIZURE  PRECAUTIONS                         -   DISCUSSED   WITH   PATIENT  AND  HIS  MOTHER                                     13)     PATIENT  AND  HIS  MOTHER   DENIES  ANY                     SEIZURE   RECURRENCE      AND   ANY    NEW                         NEUROLOGICAL    CONCERNS. The  Duration,  Quality,  Severity,  Location,  Timing,  Context,  Modifying  Factors   Of   The   Chief   Complaint       And  Present  Illness   Was   Reviewed   In   Chronological   [place].                                        Patient   Indicates   The  Presence   And  The  Absence  Of  The  Following  Associated  And         Additional  Neurological    Symptoms:                                Balance  And coordination problems  absent           Gait problems     absent            Headaches      absent              Migraines           absent           Memory problems        Absent             Confusion        absent            Paresthesia numbness          absent           Seizures  And  Starring  Episodes           present           Syncope,  Near  syncopal episodes         absent           Speech problems           absent             Swallowing  Problems      absent            Dizziness,  Light headedness           absent                        Vertigo        absent             Generalized   Weakness    absent              focal  Weakness     absent             Tremors         absent              Sleep  Problems absent             History  Of   Recent   Head  Injury     absent             History  Of   Recent  TIA     absent             History  Of   Recent    Stroke     absent             Neck  Pain and  Neck muscle  Spasms  Absent               Radiating  down   And   Weakness           absent            Lower back   Pain  And     Spasms  Absent              Radiating    Down   And   Weakness          absent                H/O   FALLS        absent               History  Of   Visual  Symptoms    Absent                  Associated   Diplopia       absent                                    Also   Additional   Symptoms   Present    As  Documented    In   The detailed      Review  Of  Systems   And    Please   Refer   To    Them for   Additional  Information. Any components  That are either  Unobtainable  Or  Limited  In   HPI, ROS  And/or PFSH   Are       Due   To   Patient's  Medical  Problems,  Clinical  Condition and/or lack of other  Alternate resources.             RECORDS   REVIEWED:    historical medical records, lab reports, office notes and radiology reports           INFORMATION   REVIEWED:     MEDICAL   HISTORY,     SURGICAL   HISTORY,   MEDICATIONS   LIST,   ALLERGIES AND  DRUG  INTOLERANCES,     FAMILY   HISTORY,  SOCIAL  HISTORY,    PROBLEM  LIST   FOR  PATIENT  CARE   COORDINATION             Past Medical History:   Diagnosis Date    Abnormal EEG     [facility]     Attention deficit hyperactivity disorder     Behavioral problems     Complex partial epileptic seizure ([address]     Epilepsy ([address]     Febrile seizures ([facility] last seizure in July 2010    Learning difficulty     Tonic clonic seizures [facility])                   Past Surgical History:   Procedure Laterality Date    CYST REMOVAL  04/2021    [place]    TONSILLECTOMY AND ADENOIDECTOMY  2003                 Current Outpatient Medications   Medication Sig Dispense Refill    divalproex (DEPAKOTE) 500 MG DR tablet ONE TABLET     TWICE  DAILY 60 tablet 5    zonisamide (ZONEGRAN) 100 MG capsule Take 1 capsule by mouth 2 times daily 60 capsule 5    vitamin D (CHOLECALCIFEROL) 25 MCG (1000 UT) TABS tablet Take 1,000 Units by mouth daily      Multiple Vitamins-Minerals (MULTIVITAL) CHEW Take  by mouth daily.  loratadine (CLARITIN) 10 MG tablet Take 10 mg by mouth as needed. No current facility-administered medications for this visit. No Known Allergies            Family History   Problem Relation Age of Onset    Depression Mother     Asthma Mother     Neuropathy Mother     Mental Illness Mother     Seizures Neg Hx              Social History     Socioeconomic History    Marital status: Single     Spouse name: Not on file    Number of children: Not on file    Years of education: Not on file    Highest education level: Not on file   Occupational History    Not on file   Tobacco Use    Smoking status: Never Smoker    Smokeless tobacco: Never Used   Vaping Use    Vaping Use: Never used   Substance and Sexual Activity    Alcohol use: No     Alcohol/week: 0.0 standard drinks    Drug use: Never    Sexual activity: Not on file   Other Topics Concern    Not on file   Social History Narrative    Not on file     Social Determinants of Health     Financial Resource Strain:     Difficulty of Paying Living Expenses:    Food Insecurity:     Worried About Running Out of Food in the Last Year:     [address] in the Last Year:    Transportation Needs:     Lack of Transportation (Medical):      Lack of Transportation (Non-Medical):    Physical Activity:     Days of Exercise per Week:     Minutes of Exercise per Session:    Stress:     Feeling of Stress :    Social Connections:     Frequency of Communication with Friends and Family:     Frequency of Social Gatherings with Friends and Family:     Attends [employer] Services:     Active Member of Clubs or Organizations:     Attends Club or Organization Meetings:     Marital Status:    Intimate Partner Violence:     Fear of Current or Ex-Partner:     Emotionally Abused:     Physically Abused:     Sexually Abused:        Vitals:    08/06/21 1500   BP: 108/72   Pulse: (!) 109   SpO2: 94%         Wt Readings from Last 3 Encounters:   08/06/21 271 lb (122.9 kg) (>99 %, Z= 2.71)*   02/04/21 (!) 260 lb (117.9 kg) (>99 %, Z= 2.57)*   07/24/20 (!) 243 lb 12.8 oz (110.6 kg) (>99 %, Z= 2.34)*     * Growth percentiles are based on [facility] (Boys, 2-20 Years) data. BP Readings from Last 3 Encounters:   08/06/21 108/72   02/04/21 110/72   07/24/20 124/82       Hematology and Coagulation  Lab Results   Component Value Date    WBC 11.3 02/04/2021    RBC 4.60 02/04/2021    HGB 13.3 02/04/2021    HCT 40.8 02/04/2021    MCV 88.7 02/04/2021    MCH 28.9 02/04/2021    MCHC 32.6 02/04/2021    RDW 13.7 02/04/2021     02/04/2021    MPV 10.0 02/04/2021       Chemistries  Lab Results   Component Value Date     02/04/2021    K 4.2 02/04/2021     02/04/2021    CO2 27 02/04/2021    BUN 16 10/16/2018    CREATININE 1.09 10/16/2018    AST 22 03/05/2019    ALT 31 03/05/2019     Lab Results   Component Value Date    ALT 31 03/05/2019    AST 22 03/05/2019     Lab Results   Component Value Date    AST 22 03/05/2019    ALT 31 03/05/2019     Lab Results   Component Value Date    ZLZCEXIA19 1066 09/29/2016         Drug Levels  Lab Results   Component Value Date    VALPROATE 54 02/04/2021    VALPROATE 62 07/24/2020           Review of Systems   Constitutional: Negative for chills, diaphoresis, fatigue and fever. HENT: Negative. Negative for congestion and sore throat. Eyes: Negative. Respiratory: Negative. Negative for cough. Cardiovascular: Negative. Negative for chest pain and palpitations. Gastrointestinal: Negative. Negative for abdominal pain, anorexia, change in bowel habit, diarrhea, nausea and vomiting. Endocrine: Negative. Genitourinary: Negative. Musculoskeletal: Negative. Negative for arthralgias, joint swelling, myalgias and neck pain. Skin: Negative. Negative for rash. Allergic/Immunologic: Negative. Neurological: Positive for seizures. Negative for dizziness, vertigo, tremors, syncope, facial asymmetry, speech difficulty, weakness, light-headedness, numbness and headaches. Hematological: Negative. Psychiatric/Behavioral: Positive for behavioral problems and decreased concentration. Negative for agitation, confusion, dysphoric mood, hallucinations, self-injury and suicidal ideas. The patient is not nervous/anxious. Objective:   Physical Exam  Constitutional:       Appearance: He is well-developed. HENT:      Head: Normocephalic and atraumatic. No raccoon eyes or Calixto's sign. Right Ear: External ear normal.      Left Ear: External ear normal.      Nose: Nose normal.   Eyes:      Conjunctiva/sclera: Conjunctivae normal.      Pupils: Pupils are equal, round, and reactive to light. Neck:      Thyroid: No thyroid mass or thyromegaly. Vascular: No carotid bruit. Trachea: No tracheal deviation. Meningeal: Brudzinski's sign and Kernig's sign absent. Cardiovascular:      Rate and Rhythm: Normal rate and regular rhythm. Pulmonary:      Effort: Pulmonary effort is normal.   Musculoskeletal:         General: No tenderness. Cervical back: Normal range of motion and neck supple. No rigidity. No muscular tenderness. Normal range of motion. Skin:     General: Skin is warm. Coloration: Skin is not pale. Findings: No erythema or rash. Nails: There is no clubbing. Psychiatric:         Attention and Perception: He is attentive. Mood and Affect: Mood is not anxious or depressed. Affect is not labile, blunt or inappropriate. Speech: He is communicative.  Speech is not rapid and pressured, delayed, slurred or tangential.         Behavior: Behavior is not agitated, slowed, aggressive, withdrawn, hyperactive or combative. Behavior is cooperative. Thought Content: Thought content is not paranoid or delusional. Thought content does not include homicidal or suicidal ideation. Thought content does not include homicidal or suicidal plan. Cognition and Memory: Memory is not impaired. He does not exhibit impaired recent memory or impaired remote memory. Judgment: Judgment is not impulsive or inappropriate. NEUROLOGICAL EXAMINATION :    A) MENTAL STATUS:                   Alert and  oriented  To time, place  And  Person. No Aphasia. No  Dysarthria. Able   To  Follow   SIMPLE      commands without   Any  Difficulty. No right  To left confusion. Normal  Speech  And language function. Insight and  Judgment ,Fund  Of  Knowledge   within normal limits                Recent  And  Remote memory  within normal limits                Attention &Concentration are within normal limits                                                     B) CRANIAL NERVES :             2 CN : Visual  Acuity  And  Visual fields  within normal limits                        Fundi  Could  Not  Be  Could  Not  Be  Evaluated. 3,4,6 CN : Both  Pupils are   Reactive and  Equal.                            Extraocular   Movements  Are  Intact. No  Nystagmus. No  DELMY. No  Afferent  Pupillary  Defect noted. 5 CN :  Normal  Facial sensations and Corneal  Reflexes           7 CN :  Normal  Facial  Symmetry  And  Strength. No facial  Weakness.            8 CN :  Hearing  Appears within normal limits          9, 10 CN: Normal spontaneous, reflex palate movements         11 CN:   Normal  Shoulder shrug and  Strength         12 CN :   Normal  Tongue movements and  Tongue  In midline                        No tongue   Fasciculations or atrophy           C) MOTOR  EXAM:                 Strength  In upper  And  Lower extremities   within normal limits               No  Drift. No  Atrophy               Rapid alternating  And  repetitions  Movements  within normal limits               Muscle  Tone  In upper  And  Lower  Extremities  Normal                No rigidity. No  Spasticity. Bradykinesia   Absent                 No  Asterixis. Sustention  Tremor , Resting  Tremor   absent                    No other  Abnormal  Movements noted           D) SENSORY :               light touch, pinprick, position  And  Vibration  within normal limits        E) REFLEXES:                   Deep  Tendon  Reflexes normal                    No pathological  Reflexes  Bilaterally. F) COORDINATION  AND  GAIT :                                Station and  Gait  normal                                        Romberg's test negative                          Ataxia negative      Assessment:      Patient Active Problem List   Diagnosis    Attention deficit hyperactivity disorder    [facility]    Abnormal EEG    Tonic clonic seizures ([address]    Behavioral problems    Epilepsy ([address]    Learning difficulty    Complex partial epileptic seizure ([address]    Absence seizure ([facility]           Plan:           VISITING DIAGNOSIS:      ICD-10-CM    1. Partial symptomatic epilepsy with complex partial seizures, not intractable, without status epilepticus (HCC)  G40.209 CBC     Electrolyte Panel     Valproic acid level, total     Zonisamide Level   2. Learning difficulty  F81.9 CBC     Electrolyte Panel     Valproic acid level, total     Zonisamide Level   3. Absence seizure ([facility]  G40. A09 CBC     Electrolyte Panel     Valproic acid level, total     Zonisamide Level   4.  Attention deficit hyperactivity disorder (ADHD), predominantly inattentive type  F90.0 CBC     Electrolyte Panel     Valproic acid level, total     Zonisamide Level   5. Other generalized epilepsy, not intractable, without status epilepticus ([address]  G40.409 CBC     Electrolyte Panel     Valproic acid level, total     Zonisamide Level   6. Febrile seizures ([address]  R56.00 CBC     Electrolyte Panel     Valproic acid level, total     Zonisamide Level   7. Tonic clonic seizures (HCC)  G40.409 CBC     Electrolyte Panel     Valproic acid level, total     Zonisamide Level   8. Abnormal EEG  R94.01 CBC     Electrolyte Panel     Valproic acid level, total     Zonisamide Level   9. Seizures (HCC)  R56.9 divalproex (DEPAKOTE) 500 MG DR tablet     CBC     Electrolyte Panel     Valproic acid level, total     Zonisamide Level              CONCERNS   &   INCREASED   RISK   FOR           *  SEIZURE  ACTIVITY,  EPILEPSY           *        VARIOUS  RISK   FACTORS   WERE  REVIEWED   AND   DISCUSSED. *  PATIENT   HAS  MULTIPLE   MEDICAL, MENTAL HEALTH  &    NEUROLOGICAL   PROBLEMS . PATIENT'S   MANAGEMENT  IS  CHALLENGING. PLAN:       Larua Price  Of  The  Diagnoses,  The  Management & Treatment  Options           AND    Care  plan  Were        Reviewed and   Discussed   With  patient and   HIS  MOTHER    . * Goals  And  Expectations  Of  The  Therapy  Discussed   And  Reviewed. *   Benefits   And   Side  Effect  Profile  Of  Medication/s   Were   Discussed             * Need   For  Further   Follow up For  The  Various  Problems  Were discussed. * Results  Of  The  Previous  Diagnostic tests were reviewed and questions answered. patient and father  understand the same. Medical  Decision  Making  Was  Made  Based on the   Complexity  Of  Above  Mentioned  Diagnoses,        Data reviewed   & diagnostic  Tests  Reviewed,  Risk  Of  Significant   Co morbidities and complicating   Factors.                  Medical  Decision  Was   High  Complexity  Due   To  The  Patient's  Multiple  Symptoms,  Advancing   Disease,      Complex people  Who  Are  With  You           B)  Make  Sure  There  Are  No  Sharp or  Hard  Objects  Around you. C)  Lay down  On  Your  Side  And  Relax. *  TO  MAINTAIN  REGULAR  SLEEP  WAKE  CYCLES. *   TO  HAVE  ADEQUATE  REST  AND   SLEEP    HOURS. *      WEIGHT   LOSS. *    AVOID  ANY USAGE OF                   TOBACCO,  EXCESSIVE  ALCOHOL  AND   ILLEGAL   SUBSTANCES          *  CONTINUE MEDICATIONS PRESCRIBED      AS    RECOMMENDED       *   Compliance   With  Medications   And  Instructions          * CURRENTLY  TOLERATING  THE  PRESCRIBED   MEDICATIONS. WITHOUT  ANY  SIGNIFICANT  SIDE  EFFECTS   &  GETTING BENEFIT. *  May   Use  Pill  Box,    If  Needed        *  MEDICATIONS TO AVOID  :    WELLBUTRIN,  ULTRAM         *    Prophylactic  Use   Of     Vitamin   B   Complex,  Folic  Acid,    Vitamin  B12        Multivitamin   Tablet  Daily    Supplementations   Over  The  Counter  Discussed           *  PATIENT  IS  ALSO   COUNSELED   TO  KEEP    ACTIVITIES         A)   SIMPLE      B)  ORGANIZED      C)  WRITE   DOWN                        Orders Placed This Encounter   Procedures    CBC    Electrolyte Panel    Valproic acid level, total    Zonisamide Level       Orders Placed This Encounter   Medications    divalproex (DEPAKOTE) 500 MG DR tablet     Sig: ONE TABLET     TWICE  DAILY     Dispense:  60 tablet     Refill:  5                     *PATIENT   TO  FOLLOW  UP  WITH   PRIMARY  CARE   AND   OTHER  CONSULTANTS  AS  BEFORE.           *TO  FOLLOW  WITH   MENTAL  HEALTH  PROFESSIONALS ,  INCLUDING            PSYCHOLOGICAL  COUNSELING   AND  PSYCHIATRIC  EVALUTIONS            *  Maintain   Healthy  Life Style    With   Periodic  Monitoring  Of      Any  Medical  Conditions  Including   Elevated  Blood  Pressure,  Lipid  Profile,     Blood  Sugar levels  And   Heart  Disease.                 *   Period   Screening  For  Cancers  Involving  Breast, Colon,    Prostate, lungs  And  Other  Organs  As  Applicable,  In consultation   With  Your  Primary Care Providers. * Second  Neurological  Opinion  And  Evaluations  In  [facility]  Setting  If  Patient  Is  Interested. *  If  The  Patient remains  Neurologically  Stable   Return   To  [facility] Neurology Department       IN  3-6   MONTHS  TIME   FOR  FURTHER  FOLLOW UP.                   *  If   There is  Any  Significant  Worsening   Of  Current  Symptoms  And  Or  If patient  Develops       Any additional  New  Neurological  Symptoms  Or  Significant  Concerns   Should  Call  911 or      Go  To  Emergency  Department  For  Further  Immediate  Evaluation. *   The  Neurological   Findings,  Possible  Diagnosis,  Differential diagnoses   And  Options      For    Further   Investigations   And  management   Are  Discussed  Comprehensively. Medications   And  Prescription   Risks  And  Side effects  Are   Also  Discussed. The  Above  Were  Reviewed  With  patient and   HIS  MOTHER    And     questions  Answered  In  Detail. More   Than   50% of face  To face Time   Was  Spent  On  Counseling   And   Coordination  Of  Care       Of   Patient's multiple   Neurological  Problems   And   Comorbid  Medical   Conditions. Electronically signed by Irish Dover MD.  [address] [place] [address]      Board Certified in  Neurology &  In  [id number] [address] [place] [address] W of Psychiatry and Neurology ([facility])      DISCLAIMER:   Although every effort was made to ensure the accuracy of this  electronic transcription, some errors in transcription may have occurred. GENERAL PATIENT INSTRUCTIONS:     A Healthy Lifestyle: Care Instructions  Your Care Instructions  A healthy lifestyle can help you feel good, stay at a healthy weight, and have plenty of energy for both work and play.  A healthy lifestyle is something you can share with your whole family. A healthy lifestyle also can lower your risk for serious health problems, such as high blood pressure, heart disease, and diabetes. You can follow a few steps listed below to improve your health and the health of your family. Follow-up care is a key part of your treatment and safety. Be sure to make and go to all appointments, and call your doctor if you are having problems. Its also a good idea to know your test results and keep a list of the medicines you take. How can you care for yourself at home? Do not eat too much sugar, fat, or fast foods. You can still have dessert and treats now and then. The goal is moderation. Start small to improve your eating habits. Pay attention to portion sizes, drink less juice and soda pop, and eat more fruits and vegetables. Eat a healthy amount of food. A 3-ounce serving of meat, for example, is about the size of a deck of cards. Fill the rest of your plate with vegetables and whole grains. Limit the amount of soda and sports drinks you have every day. Drink more water when you are thirsty. Eat at least 5 servings of fruits and vegetables every day. It may seem like a lot, but it is not hard to reach this goal. A serving or helping is 1 piece of fruit, 1 cup of vegetables, or 2 cups of leafy, raw vegetables. Have an apple or some carrot sticks as an afternoon snack instead of a candy bar. Try to have fruits and/or vegetables at every meal.  Make exercise part of your daily routine. You may want to start with simple activities, such as walking, bicycling, or slow swimming. Try to be active 30 to 60 minutes every day. You do not need to do all 30 to 60 minutes all at once. For example, you can exercise 3 times a day for 10 or 20 minutes. Moderate exercise is safe for most people, but it is always a good idea to talk to your doctor before starting an exercise program.  Keep moving. Derinda Leyden the lawn, work in the garden, or [employer]. Take the stairs instead of the elevator at work. If you smoke, quit. People who smoke have an increased risk for heart attack, stroke, cancer, and other lung illnesses. Quitting is hard, but there are ways to boost your chance of quitting tobacco for good. Use nicotine gum, patches, or lozenges. Ask your doctor about stop-smoking programs and medicines. Keep trying. In addition to reducing your risk of diseases in the future, you will notice some benefits soon after you stop using tobacco. If you have shortness of breath or asthma symptoms, they will likely get better within a few weeks after you quit. Limit how much alcohol you drink. Moderate amounts of alcohol (up to 2 drinks a day for men, 1 drink a day for women) are okay. But drinking too much can lead to liver problems, high blood pressure, and other health problems. Family health  If you have a family, there are many things you can do together to improve your health. Eat meals together as a family as often as possible. Eat healthy foods. This includes fruits, vegetables, lean meats and dairy, and whole grains. Include your family in your fitness plan. Most people think of activities such as jogging or tennis as the way to fitness, but there are many ways you and your family can be more active. Anything that makes you breathe hard and gets your heart pumping is exercise. Here are some tips:  Walk to do errands or to take your child to school or the bus. Go for a family bike ride after dinner instead of watching TV. Where can you learn more? Go to https://[place].healthToroleo. org and sign in to your [employer] account. Enter V904 in the [facility] box to learn more about \"A Healthy Lifestyle: Care Instructions. \"     If you do not have an account, please click on the \"Sign Up Now\" link. Current as of: July 26, 2016  Content Version: 11.2  © 7824-4455 [employer].  Care instructions adapted under license by [facility]. If you have questions about a medical condition or this instruction, always ask your healthcare professional. [place] [address] any warranty or liability for your use of this information.

## 2021-08-09 LAB — ZONISAMIDE: 12 UG/ML (ref 10–40)

## 2021-10-07 ENCOUNTER — OFFICE VISIT (OUTPATIENT)
Dept: NEUROLOGY | Age: 21
End: 2021-10-07
Payer: COMMERCIAL

## 2021-10-07 VITALS
HEIGHT: 71 IN | DIASTOLIC BLOOD PRESSURE: 82 MMHG | HEART RATE: 89 BPM | OXYGEN SATURATION: 98 % | WEIGHT: 269 LBS | BODY MASS INDEX: 37.66 KG/M2 | SYSTOLIC BLOOD PRESSURE: 118 MMHG

## 2021-10-07 DIAGNOSIS — R56.9 SEIZURES (HCC): ICD-10-CM

## 2021-10-07 DIAGNOSIS — F98.9 BEHAVIORAL AND EMOTIONAL DISORDER WITH ONSET IN CHILDHOOD: ICD-10-CM

## 2021-10-07 DIAGNOSIS — F90.0 ATTENTION DEFICIT HYPERACTIVITY DISORDER (ADHD), PREDOMINANTLY INATTENTIVE TYPE: ICD-10-CM

## 2021-10-07 DIAGNOSIS — G40.A09 ABSENCE SEIZURE (HCC): ICD-10-CM

## 2021-10-07 DIAGNOSIS — R94.01 ABNORMAL EEG: ICD-10-CM

## 2021-10-07 DIAGNOSIS — R56.00 FEBRILE SEIZURES (HCC): ICD-10-CM

## 2021-10-07 DIAGNOSIS — F81.9 LEARNING DIFFICULTY: ICD-10-CM

## 2021-10-07 DIAGNOSIS — G40.209 PARTIAL SYMPTOMATIC EPILEPSY WITH COMPLEX PARTIAL SEIZURES, NOT INTRACTABLE, WITHOUT STATUS EPILEPTICUS (HCC): Primary | ICD-10-CM

## 2021-10-07 PROCEDURE — G8417 CALC BMI ABV UP PARAM F/U: HCPCS | Performed by: PSYCHIATRY & NEUROLOGY

## 2021-10-07 PROCEDURE — 99214 OFFICE O/P EST MOD 30 MIN: CPT | Performed by: PSYCHIATRY & NEUROLOGY

## 2021-10-07 PROCEDURE — 1036F TOBACCO NON-USER: CPT | Performed by: PSYCHIATRY & NEUROLOGY

## 2021-10-07 PROCEDURE — G8484 FLU IMMUNIZE NO ADMIN: HCPCS | Performed by: PSYCHIATRY & NEUROLOGY

## 2021-10-07 PROCEDURE — G8427 DOCREV CUR MEDS BY ELIG CLIN: HCPCS | Performed by: PSYCHIATRY & NEUROLOGY

## 2021-10-07 RX ORDER — DIVALPROEX SODIUM 500 MG/1
TABLET, DELAYED RELEASE ORAL
Qty: 60 TABLET | Refills: 5 | Status: SHIPPED | OUTPATIENT
Start: 2021-10-07 | End: 2022-03-14 | Stop reason: SDUPTHER

## 2021-10-07 RX ORDER — ZONISAMIDE 100 MG/1
100 CAPSULE ORAL 2 TIMES DAILY
Qty: 60 CAPSULE | Refills: 5 | Status: SHIPPED | OUTPATIENT
Start: 2021-10-07 | End: 2022-03-14 | Stop reason: SDUPTHER

## 2021-10-07 ASSESSMENT — ENCOUNTER SYMPTOMS
GASTROINTESTINAL NEGATIVE: 1
VOMITING: 0
SORE THROAT: 0
ALLERGIC/IMMUNOLOGIC NEGATIVE: 1
SWOLLEN GLANDS: 0
ABDOMINAL PAIN: 0
VISUAL CHANGE: 0
RESPIRATORY NEGATIVE: 1
DIARRHEA: 0
COUGH: 0
EYES NEGATIVE: 1
CHANGE IN BOWEL HABIT: 0
NAUSEA: 0

## 2021-10-07 NOTE — PROGRESS NOTES
Subjective:          Patient ID: Isael Riggs is a 24 y.o. RIGHT   HANDED   . Seizures  This is a chronic problem. Episode onset: IN  CHILD [place]. The problem has been resolved. Pertinent negatives include no abdominal pain, anorexia, arthralgias, change in bowel habit, chest pain, chills, congestion, coughing, diaphoresis, fatigue, fever, headaches, joint swelling, myalgias, nausea, neck pain, numbness, rash, sore throat, swollen glands, urinary symptoms, vertigo, visual change, vomiting or weakness. Nothing aggravates the symptoms. Treatments tried: ANTI  EPILEPTIC MEDICATIONS. The treatment provided significant relief. History obtained from  The patient and  HIS  MOTHER       and other  available medical records were  Also  reviewed.               1)      H/O  KNOWN  CHILDHOOD ONSET  EPILEPSY                 FIRST    SEIZURE    REPORTED   TO   BE  AT   THE  AGE                        OF   2 1/2   YEARS  OLD    AS  PER  HIS  MOTHER                               -   STABLE          2)       PREVIOUS    H/O    FEBRILE SEIZURES                       -  NO  RECURRENCE                3)        PATIENT'S   PREVIOUS    SEIZURE  TYPES  INCLUDE :                  GTC  SEIZURES,  STARING  EPISODES,                 ABSENCE  AND  PARTIAL  COMPLEX  SEIZURES                        -     WELL  CONTROLLED                4)       FAMILY   H/O  SEIZURE  DISORDER/  EPILEPSY          5)       PREVIOUS     H/O  ADHD                         -   STABLE                PATIENT  TO  FOLLOW  WITH  MENTAL  HEALTH  PROFESSIONALS              6)        KNOWN     H/O   LEARNING  DIFFICULTY                      -  IMPROVED                    PATIENT   COMPLETED       12  TH  GRADE                         WITH       VOCATIONAL  EDUCATION            7)       PREVIOUS  H/O  BEHAVIORAL  PROBLEMS                    -  WELL  CONTROLLED          8)         LAST  SEIZURE  ACTIVITY   REPORTED  TO  BE  IN  December 2014            9) HAS   BEEN      ON  DEPAKOTE  AND  ZONEGRAN                TOLERATING  THE SAME  WITHOUT  ANY  SIDE  EFFECTS            10)       EEG   IN  December 2017    SHOWED                   NO  EPILEPTIFORM  FEATURES. 11)         PATIENT  NEUROLOGICALLY   STABLE                      WITHOUT  ANY  RECURRENCE  OF SEIZURE  ACTIVITY. NO  MEMORY PROBLEMS. NO  CONFUSIONAL EPISODES. NO   STARING  EPISODES            12)       PATIENT     GOT     DRIVERS  LICENSE   PERMIT  IN    9/99/[id number]                        PATIENT      SHOULD   CONTINUE  :                      A)    ANTI  EPILEPTIC  MEDICATIONS  PROPERLY,                     B)    FOLLOW  SEIZURE  PRECAUTIONS                         -   DISCUSSED   WITH   PATIENT  AND  HIS  MOTHER                                     13)      ANTI  EPILEPTIC  MEDICATION  LEVELS    IN   AUG.  2021                          ARE    WITH  IN  NORMAL  LIMITS. 14)      PATIENT   AND  HIS  MOTHER  REQUESTED      COMPLETION  OF                         [facility]     FORM     FOR   HIS  CONTINUED   DRIVING  PRIVILEGES                                 -     COMPLETED    ON    10/07/2021                   15)             PATIENT  AND  HIS  MOTHER   DENIED  ANY                     SEIZURE   RECURRENCE      AND   ANY    NEW                         NEUROLOGICAL    CONCERNS. The  Duration,  Quality,  Severity,  Location,  Timing,  Context,  Modifying  Factors   Of   The   Chief   Complaint       And  Present  Illness   Was   Reviewed   In   Chronological   [place].                                        Patient   Indicates   The  Presence   And  The  Absence  Of  The  Following  Associated  And         Additional  Neurological    Symptoms:                                Balance  And coordination problems  absent           Gait problems     absent            Headaches      absent Migraines           absent           Memory problems        Absent             Confusion        absent            Paresthesia numbness          absent           Seizures  And  Starring  Episodes           present           Syncope,  Near  syncopal episodes         absent           Speech problems           absent             Swallowing  Problems      absent            Dizziness,  Light headedness           absent                        Vertigo        absent             Generalized   Weakness    absent              focal  Weakness     absent             Tremors         absent              Sleep  Problems     absent             History  Of   Recent   Head  Injury     absent             History  Of   Recent  TIA     absent             History  Of   Recent    Stroke     absent             Neck  Pain and  Neck muscle  Spasms  Absent               Radiating  down   And   Weakness           absent            Lower back   Pain  And     Spasms  Absent              Radiating    Down   And   Weakness          absent                H/O   FALLS        absent               History  Of   Visual  Symptoms    Absent                  Associated   Diplopia       absent                                    Also   Additional   Symptoms   Present    As  Documented    In   The detailed      Review  Of  Systems   And    Please   Refer   To    Them for   Additional  Information. Any components  That are either  Unobtainable  Or  Limited  In   HPI, ROS  And/or PFSH   Are       Due   To   Patient's  Medical  Problems,  Clinical  Condition and/or lack of other  Alternate resources.             RECORDS   REVIEWED:    historical medical records, lab reports, office notes and radiology reports           INFORMATION   REVIEWED:     MEDICAL   HISTORY,     SURGICAL   HISTORY,   MEDICATIONS   LIST,   ALLERGIES AND  DRUG  INTOLERANCES,     FAMILY   HISTORY,  SOCIAL  HISTORY,    PROBLEM  LIST   FOR  PATIENT  CARE   COORDINATION Past Medical History:   Diagnosis Date    Abnormal EEG     [facility]     Attention deficit hyperactivity disorder     Behavioral problems     Complex partial epileptic seizure [facility]     Epile[facility]     Febrile seizures ([facility] [address] [facility] last seizure in July 2010    Learning difficulty     Tonic clonic seizures [facility])                   Past Surgical History:   Procedure Laterality Date    CYST REMOVAL  04/2021    Tailbone    TONSILLECTOMY AND ADENOIDECTOMY  2003                 Current Outpatient Medications   Medication Sig Dispense Refill    divalproex (DEPAKOTE) 500 MG DR tablet ONE TABLET     TWICE  DAILY 60 tablet 5    zonisamide (ZONEGRAN) 100 MG capsule Take 1 capsule by mouth 2 times daily 60 capsule 5    vitamin D (CHOLECALCIFEROL) 25 MCG (1000 UT) TABS tablet Take 1,000 Units by mouth daily      Multiple Vitamins-Minerals (MULTIVITAL) CHEW Take  by mouth daily.  loratadine (CLARITIN) 10 MG tablet Take 10 mg by mouth as needed. No current facility-administered medications for this visit.            No Known Allergies            Family History   Problem Relation Age of Onset    Depression Mother     Asthma Mother     Neuropathy Mother     Mental Illness Mother     Seizures Neg Hx              Social History     Socioeconomic History    Marital status: Single     Spouse name: Not on file    Number of children: Not on file    Years of education: Not on file    Highest education level: Not on file   Occupational History    Not on file   Tobacco Use    Smoking status: Never Smoker    Smokeless tobacco: Never Used   Vaping Use    Vaping Use: Never used   Substance and Sexual Activity    Alcohol use: No     Alcohol/week: 0.0 standard drinks    Drug use: Never    Sexual activity: Not on file   Other Topics Concern    Not on file   Social History Narrative    Not on file     Social Determinants of Health     Financial Resource Strain:     Difficulty of Paying Living Expenses:    Food Insecurity:     Worried About Running Out of Food in the Last Year:     [address] in the Last Year:    Transportation Needs:     Lack of Transportation (Medical):  Lack of Transportation (Non-Medical):    Physical Activity:     Days of Exercise per Week:     Minutes of Exercise per Session:    Stress:     Feeling of Stress :    Social Connections:     Frequency of Communication with Friends and Family:     Frequency of Social Gatherings with Friends and Family:     Attends [employer] Services:     Active Member of Clubs or Organizations:     Attends Club or Organization Meetings:     Marital Status:    Intimate Partner Violence:     Fear of Current or Ex-Partner:     Emotionally Abused:     Physically Abused:     Sexually Abused:        Vitals:    10/07/21 1428   BP: 118/82   Pulse: 89   SpO2: 98%         Wt Readings from Last 3 Encounters:   10/07/21 269 lb (122 kg) (>99 %, Z= 2.68)*   08/06/21 271 lb (122.9 kg) (>99 %, Z= 2.71)*   02/04/21 (!) 260 lb (117.9 kg) (>99 %, Z= 2.57)*     * Growth percentiles are based on CDC (Boys, 2-20 Years) data.          BP Readings from Last 3 Encounters:   10/07/21 118/82   08/06/21 108/72   02/04/21 110/72       Hematology and Coagulation  Lab Results   Component Value Date    WBC 7.9 08/06/2021    RBC 4.83 08/06/2021    HGB 13.9 08/06/2021    HCT 42.4 08/06/2021    MCV 87.8 08/06/2021    MCH 28.8 08/06/2021    MCHC 32.8 08/06/2021    RDW 13.7 08/06/2021     08/06/2021    MPV 10.1 08/06/2021       Chemistries  Lab Results   Component Value Date     08/06/2021    K 4.0 08/06/2021     08/06/2021    CO2 25 08/06/2021    BUN 16 10/16/2018    CREATININE 1.09 10/16/2018    AST 22 03/05/2019    ALT 31 03/05/2019     Lab Results   Component Value Date    ALT 31 03/05/2019    AST 22 03/05/2019     Lab Results   Component Value Date    AST 22 03/05/2019    ALT 31 03/05/2019     Lab Results   Component Value Date    [id number] 1066 09/29/2016         Drug Levels  Lab Results   Component Value Date    VALPROATE 73 08/06/2021    VALPROATE 54 02/04/2021           Review of Systems   Constitutional: Negative for chills, diaphoresis, fatigue and fever. HENT: Negative. Negative for congestion and sore throat. Eyes: Negative. Respiratory: Negative. Negative for cough. Cardiovascular: Negative. Negative for chest pain and palpitations. Gastrointestinal: Negative. Negative for abdominal pain, anorexia, change in bowel habit, diarrhea, nausea and vomiting. Endocrine: Negative. Genitourinary: Negative. Musculoskeletal: Negative. Negative for arthralgias, joint swelling, myalgias and neck pain. Skin: Negative. Negative for rash. Allergic/Immunologic: Negative. Neurological: Positive for seizures. Negative for dizziness, vertigo, tremors, syncope, facial asymmetry, speech difficulty, weakness, light-headedness, numbness and headaches. Hematological: Negative. Psychiatric/Behavioral: Positive for behavioral problems and decreased concentration. Negative for agitation, confusion, dysphoric mood, hallucinations, self-injury and suicidal ideas. The patient is not nervous/anxious. Objective:   Physical Exam  Constitutional:       Appearance: He is well-developed. HENT:      Head: Normocephalic and atraumatic. No raccoon eyes or Calixto's sign. Right Ear: External ear normal.      Left Ear: External ear normal.      Nose: Nose normal.   Eyes:      Conjunctiva/sclera: Conjunctivae normal.      Pupils: Pupils are equal, round, and reactive to light. Neck:      Thyroid: No thyroid mass or thyromegaly. Vascular: No carotid bruit. Trachea: No tracheal deviation. Meningeal: Brudzinski's sign and Kernig's sign absent. Cardiovascular:      Rate and Rhythm: Normal rate and regular rhythm. Pulmonary:      Effort: Pulmonary effort is normal.   Musculoskeletal:         General: No tenderness. Cervical back: Normal range of motion and neck supple. No rigidity. No muscular tenderness. Normal range of motion. Skin:     General: Skin is warm. Coloration: Skin is not pale. Findings: No erythema or rash. Nails: There is no clubbing. Psychiatric:         Attention and Perception: He is attentive. Mood and Affect: Mood is not anxious or depressed. Affect is not labile, blunt or inappropriate. Speech: He is communicative. Speech is not rapid and pressured, delayed, slurred or tangential.         Behavior: Behavior is not agitated, slowed, aggressive, withdrawn, hyperactive or combative. Behavior is cooperative. Thought Content: Thought content is not paranoid or delusional. Thought content does not include homicidal or suicidal ideation. Thought content does not include homicidal or suicidal plan. Cognition and Memory: Memory is not impaired. He does not exhibit impaired recent memory or impaired remote memory. Judgment: Judgment is not impulsive or inappropriate. NEUROLOGICAL EXAMINATION :    A) MENTAL STATUS:                   Alert and  oriented  To time, place  And  Person. No Aphasia. No  Dysarthria. Able   To  Follow   SIMPLE      commands without   Any  Difficulty. No right  To left confusion. Normal  Speech  And language function. Insight and  Judgment ,Fund  Of  Knowledge   within normal limits                Recent  And  Remote memory  within normal limits                Attention &Concentration are within normal limits                                                     B) CRANIAL NERVES :             2 CN : Visual  Acuity  And  Visual fields  within normal limits                        Fundi  Could  Not  Be  Could  Not  Be  Evaluated.            3,4,6 CN : Both  Pupils are   Reactive and  Equal.                            Extraocular   Movements  Are Intact. No  Nystagmus. No  DELMY. No  Afferent  Pupillary  Defect noted. 5 CN :  Normal  Facial sensations and Corneal  Reflexes           7 CN :  Normal  Facial  Symmetry  And  Strength. No facial  Weakness. 8 CN :  Hearing  Appears within normal limits          9, 10 CN: Normal spontaneous, reflex palate movements         11 CN:   Normal  Shoulder shrug and  Strength         12 CN :   Normal  Tongue movements and  Tongue  In midline                        No tongue   Fasciculations or atrophy           C) MOTOR  EXAM:                 Strength  In upper  And  Lower extremities   within normal limits               No  Drift. No  Atrophy               Rapid alternating  And  repetitions  Movements  within normal limits               Muscle  Tone  In upper  And  Lower  Extremities  Normal                No rigidity. No  Spasticity. Bradykinesia   Absent                 No  Asterixis. Sustention  Tremor , Resting  Tremor   absent                    No other  Abnormal  Movements noted           D) SENSORY :               light touch, pinprick, position  And  Vibration  within normal limits        E) REFLEXES:                   Deep  Tendon  Reflexes normal                    No pathological  Reflexes  Bilaterally.                                     F) COORDINATION  AND  GAIT :                                Station and  Gait  normal                                        Romberg's test negative                          Ataxia negative      Assessment:      Patient Active Problem List   Diagnosis    Attention deficit hyperactivity disorder    Febrile seizures ([address]    Abnormal EEG    Tonic clonic seizures (HCC)    Behavioral and emotional disorder with onset in childhood    Epilepsy ([address]    Learning difficulty    Complex partial epileptic seizure ([address]    Absence seizure ([address]           Plan:           VISITING DIAGNOSIS: ICD-10-CM    1. Partial symptomatic epilepsy with complex partial seizures, not intractable, without status epilepticus ([facility] [address]  G40.209    2. Learning difficulty  F81.9    3. Behavioral and emotional disorder with onset in childhood  F98.9    4. Febrile seizures ([facility] [address]  R56.00    5. Absence seizure ([facility] [address]  G40. A09    6. Abnormal EEG  R94.01    7. Attention deficit hyperactivity disorder (ADHD), predominantly inattentive type  F90.0               CONCERNS   &   INCREASED   RISK   FOR           *  SEIZURE  ACTIVITY,  EPILEPSY           *        VARIOUS  RISK   FACTORS   WERE  REVIEWED   AND   DISCUSSED. *  PATIENT   HAS  MULTIPLE   MEDICAL, MENTAL HEALTH  &    NEUROLOGICAL   PROBLEMS . PATIENT'S   MANAGEMENT  IS  CHALLENGING. PLAN:       Diane Zhang  Of  The  Diagnoses,  The  Management & Treatment  Options           AND    Care  plan  Were        Reviewed and   Discussed   With  patient and   HIS  MOTHER    . * Goals  And  Expectations  Of  The  Therapy  Discussed   And  Reviewed. *   Benefits   And   Side  Effect  Profile  Of  Medication/s   Were   Discussed             * Need   For  Further   Follow up For  The  Various  Problems  Were discussed. * Results  Of  The  Previous  Diagnostic tests were reviewed and questions answered. patient and father  understand the same. Medical  Decision  Making  Was  Made  Based on the   Complexity  Of  Above  Mentioned  Diagnoses,        Data reviewed   & diagnostic  Tests  Reviewed,  Risk  Of  Significant   Co morbidities and complicating   Factors. Medical  Decision  Was   High  Complexity  Due   To  The  Patient's  Multiple  Symptoms,  Advancing   Disease,      Complex  Treatment  Regimen,  Multiple medications and   Risk  Of   Side  Effects,  Difficulty  In  Medication  Management      And  Diagnostic  Challenges   In  View  Of  The  Associated   Co  Morbid  Conditions   And  Problems. *    SUPERVISED   CARE      *   BE  CAREFUL  WITH  ACTIVITIES             *   ADEQUATE   FLUID  INTAKE   AND  ELECTROLYTE  BALANCE             * KEEP  DAIRY  OF   THE  NEUROLOGICAL  SYMPTOMS        RECORDING THE    DURATION  AND  FREQUENCY. *  AVOID    CONDITIONS  AND  FACTORS   THAT  MAKE   NEUROLOGICAL  SYMPTOMS  WORSE. *   SEIZURE  PRECAUTIONS.  -     DISCUSSED                    A)  Avoid  Working  At   [employer] B)  Avoid  Working  With  Heavy machinery. C)  Avoid   Swimming,  Climbing  A  Ladder   Unattended. D)  Avoid   Driving   If  You   Have  A  Seizure. E)  Must   Be  Seizure  Free   For  At   Least   6 months,  Before   You  Can drive. F) Some times  Your  May  Feel  Seizure coming  Before  It  Begins. You  May feel               Strange smell or funny  Feeling  In  Your  Stomach,  Which is  Called   Aura. TIPS  TO  REDUCE/ PREVENT  SEIZURES         1. Take  Your  Anti seizure  Medications   As   Recommended. 2. Get   Enough   Sleep. Sleep  Deprivation   Can  Trigger  A  Seizure. 3. If   You   Have  A fever,  Treat  It  At  Once,  And  Contact   Your  Primary  Care Providers. 4. Avoid   Alcohol. 5. Avoid  Flashing  Lights,  Loud  Noises and  TV  And  Video  Games,             As   These  May  Trigger   Your  Seizures       6. Control  Your  Stress  And   Have  Adequate  Rest.       7.   If  You  Feel  A  Seizure  Coming   On :             A) warn people  Who  Are  With  You           B)  Make  Sure  There  Are  No  Sharp or  Hard  Objects  Around you. C)  Lay down  On  Your  Side  And  Relax.                   *  TO  MAINTAIN  REGULAR  SLEEP Periodic  Monitoring  Of      Any  Medical  Conditions  Including   Elevated  Blood  Pressure,  Lipid  Profile,     Blood  Sugar levels  And   Heart  Disease. *   Period   Screening  For  Cancers  Involving  Breast,  Colon,    Prostate, lungs  And  Other  Organs  As  Applicable,  In consultation   With  Your  Primary Care Providers. * Second  Neurological  Opinion  And  Evaluations  In  [facility]  Setting  If  Patient  Is  Interested. *  If  The  Patient remains  Neurologically  Stable   Return   To  [facility] Neurology Department       IN  3-6   MONTHS  TIME   FOR  FURTHER  FOLLOW UP.                   *  If   There is  Any  Significant  Worsening   Of  Current  Symptoms  And  Or  If patient  Develops       Any additional  New  Neurological  Symptoms  Or  Significant  Concerns   Should  Call  911 or      Go  To  Emergency  Department  For  Further  Immediate  Evaluation. *   The  Neurological   Findings,  Possible  Diagnosis,  Differential diagnoses   And  Options      For    Further   Investigations   And  management   Are  Discussed  Comprehensively. Medications   And  Prescription   Risks  And  Side effects  Are   Also  Discussed. The  Above  Were  Reviewed  With  patient and   HIS  MOTHER    And     questions  Answered  In  Detail. More   Than   50% of face  To face Time   Was  Spent  On  Counseling   And   Coordination  Of  Care       Of   Patient's multiple   Neurological  Problems   And   Comorbid  Medical   Conditions. Electronically signed by Xi Dawn MD.  [facility]      Board Certified in  Neurology &  In  [address] of Psychiatry and Neurology ([employer])      DISCLAIMER:   Although every effort was made to ensure the accuracy of this  electronic transcription, some errors in transcription may have occurred.       GENERAL PATIENT INSTRUCTIONS:     A Healthy Lifestyle: Care Instructions  Your Care Instructions  A healthy lifestyle can help you feel good, stay at a healthy weight, and have plenty of energy for both work and play. A healthy lifestyle is something you can share with your whole family. A healthy lifestyle also can lower your risk for serious health problems, such as high blood pressure, heart disease, and diabetes. You can follow a few steps listed below to improve your health and the health of your family. Follow-up care is a key part of your treatment and safety. Be sure to make and go to all appointments, and call your doctor if you are having problems. Its also a good idea to know your test results and keep a list of the medicines you take. How can you care for yourself at home? Do not eat too much sugar, fat, or fast foods. You can still have dessert and treats now and then. The goal is moderation. Start small to improve your eating habits. Pay attention to portion sizes, drink less juice and soda pop, and eat more fruits and vegetables. Eat a healthy amount of food. A 3-ounce serving of meat, for example, is about the size of a deck of cards. Fill the rest of your plate with vegetables and whole grains. Limit the amount of soda and sports drinks you have every day. Drink more water when you are thirsty. Eat at least 5 servings of fruits and vegetables every day. It may seem like a lot, but it is not hard to reach this goal. A serving or helping is 1 piece of fruit, 1 cup of vegetables, or 2 cups of leafy, raw vegetables. Have an apple or some carrot sticks as an afternoon snack instead of a candy bar. Try to have fruits and/or vegetables at every meal.  Make exercise part of your daily routine. You may want to start with simple activities, such as walking, bicycling, or slow swimming. Try to be active 30 to 60 minutes every day. You do not need to do all 30 to 60 minutes all at once.  For example, you can exercise 3 times a day for 10 or 20 minutes. Moderate exercise is safe for most people, but it is always a good idea to talk to your doctor before starting an exercise program.  Keep moving. Victor Hugo Shuklain the lawn, work in the garden, or [employer]. Take the stairs instead of the elevator at work. If you smoke, quit. People who smoke have an increased risk for heart attack, stroke, cancer, and other lung illnesses. Quitting is hard, but there are ways to boost your chance of quitting tobacco for good. Use nicotine gum, patches, or lozenges. Ask your doctor about stop-smoking programs and medicines. Keep trying. In addition to reducing your risk of diseases in the future, you will notice some benefits soon after you stop using tobacco. If you have shortness of breath or asthma symptoms, they will likely get better within a few weeks after you quit. Limit how much alcohol you drink. Moderate amounts of alcohol (up to 2 drinks a day for men, 1 drink a day for women) are okay. But drinking too much can lead to liver problems, high blood pressure, and other health problems. Family health  If you have a family, there are many things you can do together to improve your health. Eat meals together as a family as often as possible. Eat healthy foods. This includes fruits, vegetables, lean meats and dairy, and whole grains. Include your family in your fitness plan. Most people think of activities such as jogging or tennis as the way to fitness, but there are many ways you and your family can be more active. Anything that makes you breathe hard and gets your heart pumping is exercise. Here are some tips:  Walk to do errands or to take your child to school or the bus. Go for a family bike ride after dinner instead of watching TV. Where can you learn more? Go to https://[facility]riana.[email or web address]. org and sign in to your [facility] account.  Enter C294 in the [employer] box to learn more about \"A Healthy Lifestyle: Care Instructions. \"     If you do not have an account, please click on the \"Sign Up Now\" link. Current as of: July 26, 2016  Content Version: 11.2  © 7729-0435 [employer]. Care instructions adapted under license by [facility] ([facility]). If you have questions about a medical condition or this instruction, always ask your healthcare professional. [place] [address] any warranty or liability for your use of this information.

## 2022-03-14 ENCOUNTER — HOSPITAL ENCOUNTER (OUTPATIENT)
Dept: LAB | Age: 22
Discharge: HOME | End: 2022-03-14
Payer: COMMERCIAL

## 2022-03-14 ENCOUNTER — OFFICE VISIT (OUTPATIENT)
Dept: NEUROLOGY | Age: 22
End: 2022-03-14
Payer: COMMERCIAL

## 2022-03-14 ENCOUNTER — TELEPHONE (OUTPATIENT)
Dept: NEUROLOGY | Age: 22
End: 2022-03-14

## 2022-03-14 VITALS
DIASTOLIC BLOOD PRESSURE: 80 MMHG | SYSTOLIC BLOOD PRESSURE: 118 MMHG | HEART RATE: 103 BPM | HEIGHT: 71 IN | WEIGHT: 266 LBS | OXYGEN SATURATION: 98 % | TEMPERATURE: 98.1 F | BODY MASS INDEX: 37.24 KG/M2

## 2022-03-14 DIAGNOSIS — R56.00 FEBRILE SEIZURES (HCC): ICD-10-CM

## 2022-03-14 DIAGNOSIS — R94.01 ABNORMAL EEG: ICD-10-CM

## 2022-03-14 DIAGNOSIS — F90.0 ATTENTION DEFICIT HYPERACTIVITY DISORDER (ADHD), PREDOMINANTLY INATTENTIVE TYPE: ICD-10-CM

## 2022-03-14 DIAGNOSIS — F81.9 LEARNING DIFFICULTY: ICD-10-CM

## 2022-03-14 DIAGNOSIS — R56.9 SEIZURES (HCC): ICD-10-CM

## 2022-03-14 DIAGNOSIS — F98.9 BEHAVIORAL AND EMOTIONAL DISORDER WITH ONSET IN CHILDHOOD: ICD-10-CM

## 2022-03-14 DIAGNOSIS — G40.209 PARTIAL SYMPTOMATIC EPILEPSY WITH COMPLEX PARTIAL SEIZURES, NOT INTRACTABLE, WITHOUT STATUS EPILEPTICUS (HCC): Primary | ICD-10-CM

## 2022-03-14 LAB
ANION GAP SERPL CALCULATED.3IONS-SCNC: 12 MMOL/L (ref 9–17)
CHLORIDE BLD-SCNC: 105 MMOL/L (ref 98–107)
CO2: 26 MMOL/L (ref 20–31)
FOLATE: 19.4 NG/ML
HCT VFR BLD CALC: 44.7 % (ref 40.7–50.3)
HEMOGLOBIN: 15 G/DL (ref 13–17)
MCH RBC QN AUTO: 29.7 PG (ref 25.2–33.5)
MCHC RBC AUTO-ENTMCNC: 33.6 G/DL (ref 25.2–33.5)
MCV RBC AUTO: 88.5 FL (ref 82.6–102.9)
NRBC AUTOMATED: 0 PER 100 WBC
PDW BLD-RTO: 13.6 % (ref 11.8–14.4)
PLATELET # BLD: 266 K/UL (ref 138–453)
PMV BLD AUTO: 10.3 FL (ref 8.1–13.5)
POTASSIUM SERPL-SCNC: 4.4 MMOL/L (ref 3.7–5.3)
RBC # BLD: 5.05 M/UL (ref 4.21–5.77)
SODIUM BLD-SCNC: 143 MMOL/L (ref 135–144)
TSH SERPL DL<=0.05 MIU/L-ACNC: 3.58 MIU/L (ref 0.3–5)
VALPROIC ACID LEVEL: 56 UG/ML (ref 50–125)
VITAMIN B-12: 1194 PG/ML (ref 232–1245)
WBC # BLD: 9.7 K/UL (ref 4.5–13.5)

## 2022-03-14 PROCEDURE — 1036F TOBACCO NON-USER: CPT | Performed by: PSYCHIATRY & NEUROLOGY

## 2022-03-14 PROCEDURE — 82607 VITAMIN B-12: CPT

## 2022-03-14 PROCEDURE — G8417 CALC BMI ABV UP PARAM F/U: HCPCS | Performed by: PSYCHIATRY & NEUROLOGY

## 2022-03-14 PROCEDURE — 80164 ASSAY DIPROPYLACETIC ACD TOT: CPT

## 2022-03-14 PROCEDURE — 36415 COLL VENOUS BLD VENIPUNCTURE: CPT

## 2022-03-14 PROCEDURE — 82746 ASSAY OF FOLIC ACID SERUM: CPT

## 2022-03-14 PROCEDURE — G8484 FLU IMMUNIZE NO ADMIN: HCPCS | Performed by: PSYCHIATRY & NEUROLOGY

## 2022-03-14 PROCEDURE — 80051 ELECTROLYTE PANEL: CPT

## 2022-03-14 PROCEDURE — 99212 OFFICE O/P EST SF 10 MIN: CPT | Performed by: PSYCHIATRY & NEUROLOGY

## 2022-03-14 PROCEDURE — G8427 DOCREV CUR MEDS BY ELIG CLIN: HCPCS | Performed by: PSYCHIATRY & NEUROLOGY

## 2022-03-14 PROCEDURE — 85027 COMPLETE CBC AUTOMATED: CPT

## 2022-03-14 PROCEDURE — 80203 DRUG SCREEN QUANT ZONISAMIDE: CPT

## 2022-03-14 PROCEDURE — 99214 OFFICE O/P EST MOD 30 MIN: CPT | Performed by: PSYCHIATRY & NEUROLOGY

## 2022-03-14 PROCEDURE — 84443 ASSAY THYROID STIM HORMONE: CPT

## 2022-03-14 RX ORDER — DIVALPROEX SODIUM 500 MG/1
TABLET, DELAYED RELEASE ORAL
Qty: 60 TABLET | Refills: 5 | Status: SHIPPED | OUTPATIENT
Start: 2022-03-14 | End: 2022-09-14 | Stop reason: SDUPTHER

## 2022-03-14 RX ORDER — ZONISAMIDE 100 MG/1
100 CAPSULE ORAL 2 TIMES DAILY
Qty: 60 CAPSULE | Refills: 5 | Status: SHIPPED | OUTPATIENT
Start: 2022-03-14 | End: 2022-09-14 | Stop reason: SDUPTHER

## 2022-03-14 ASSESSMENT — ENCOUNTER SYMPTOMS
VOMITING: 0
DIARRHEA: 0
SWOLLEN GLANDS: 0
ALLERGIC/IMMUNOLOGIC NEGATIVE: 1
RESPIRATORY NEGATIVE: 1
VISUAL CHANGE: 0
ABDOMINAL PAIN: 0
SORE THROAT: 0
COUGH: 0
EYES NEGATIVE: 1
CHANGE IN BOWEL HABIT: 0
NAUSEA: 0
GASTROINTESTINAL NEGATIVE: 1

## 2022-03-14 NOTE — PROGRESS NOTES
Subjective:        Patient ID: Oma Kocher is a 22 y.o. RIGHT   HANDED   . Seizures  This is a chronic problem. Episode onset: IN  CHILD [place]. The problem has been resolved. Pertinent negatives include no abdominal pain, anorexia, arthralgias, change in bowel habit, chest pain, chills, congestion, coughing, diaphoresis, fatigue, fever, headaches, joint swelling, myalgias, nausea, neck pain, numbness, rash, sore throat, swollen glands, urinary symptoms, vertigo, visual change, vomiting or weakness. Nothing aggravates the symptoms. Treatments tried: ANTI  EPILEPTIC MEDICATIONS. The treatment provided significant relief. History obtained from  The patient and  HIS     BIOLOGICAL      MOTHER         and other  available medical records were  Also  reviewed.               1)      H/O  KNOWN  CHILDHOOD ONSET  EPILEPSY                 FIRST    SEIZURE    REPORTED   TO   BE  AT   THE  AGE                        OF   2 1/2   YEARS  OLD    AS  PER  HIS  MOTHER                               -   STABLE          2)       PREVIOUS    H/O    FEBRILE SEIZURES                       -  NO  RECURRENCE                3)        PATIENT'S   PREVIOUS    SEIZURE  TYPES  INCLUDE :                  GTC  SEIZURES,  STARING  EPISODES,                 ABSENCE  AND  PARTIAL  COMPLEX  SEIZURES                        -     WELL  CONTROLLED                4)       FAMILY   H/O  SEIZURE  DISORDER/  EPILEPSY          5)       PREVIOUS     H/O  ADHD                         -   STABLE                PATIENT  TO  FOLLOW  WITH  MENTAL  HEALTH  PROFESSIONALS              6)        KNOWN     H/O   LEARNING  DIFFICULTY                      -  IMPROVED                    PATIENT   COMPLETED       12  TH  GRADE                         WITH       VOCATIONAL  EDUCATION            7)       PREVIOUS  H/O  BEHAVIORAL  PROBLEMS                    -  WELL  CONTROLLED          8)         LAST  SEIZURE  ACTIVITY   REPORTED  TO  BE  IN  December absent              Migraines           absent           Memory problems        Absent             Confusion        absent            Paresthesia numbness          absent           Seizures  And  Starring  Episodes           present           Syncope,  Near  syncopal episodes         absent           Speech problems           absent             Swallowing  Problems      absent            Dizziness,  Light headedness           absent                        Vertigo        absent             Generalized   Weakness    absent              focal  Weakness     absent             Tremors         absent              Sleep  Problems     absent             History  Of   Recent   Head  Injury     absent             History  Of   Recent  TIA     absent             History  Of   Recent    Stroke     absent             Neck  Pain and  Neck muscle  Spasms  Absent               Radiating  down   And   Weakness           absent            Lower back   Pain  And     Spasms  Absent              Radiating    Down   And   Weakness          absent                H/O   FALLS        absent               History  Of   Visual  Symptoms    Absent                  Associated   Diplopia       absent                                    Also   Additional   Symptoms   Present    As  Documented    In   The detailed      Review  Of  Systems   And    Please   Refer   To    Them for   Additional  Information. Any components  That are either  Unobtainable  Or  Limited  In   HPI, ROS  And/or PFSH   Are       Due   To   Patient's  Medical  Problems,  Clinical  Condition and/or lack of other  Alternate resources.             RECORDS   REVIEWED:    historical medical records, lab reports, office notes and radiology reports           INFORMATION   REVIEWED:     MEDICAL   HISTORY,     SURGICAL   HISTORY,   MEDICATIONS   LIST,   ALLERGIES AND  DRUG  INTOLERANCES,     FAMILY   HISTORY,  SOCIAL  HISTORY,    PROBLEM  LIST   FOR  PATIENT  CARE COORDINATION             Past Medical History:   Diagnosis Date    Abnormal EEG     [facility]     Attention deficit hyperactivity disorder     Behavioral problems     Complex partial epileptic seizure [facility]     Epile[facility]     Febrile seizures ([facility] last seizure in July 2010    Learning difficulty     Tonic clonic seizures [facility])                   Past Surgical History:   Procedure Laterality Date    CYST REMOVAL  04/2021    [place]    TONSILLECTOMY AND ADENOIDECTOMY  2003                 Current Outpatient Medications   Medication Sig Dispense Refill    divalproex (DEPAKOTE) 500 MG DR tablet ONE TABLET     TWICE  DAILY 60 tablet 5    zonisamide (ZONEGRAN) 100 MG capsule Take 1 capsule by mouth 2 times daily 60 capsule 5    vitamin D (CHOLECALCIFEROL) 25 MCG (1000 UT) TABS tablet Take 1,000 Units by mouth daily      Multiple Vitamins-Minerals (MULTIVITAL) CHEW Take  by mouth daily.  loratadine (CLARITIN) 10 MG tablet Take 10 mg by mouth as needed. No current facility-administered medications for this visit.            No Known Allergies            Family History   Problem Relation Age of Onset    Depression Mother     Asthma Mother     Neuropathy Mother     Mental Illness Mother     Seizures Neg Hx              Social History     Socioeconomic History    Marital status: Single     Spouse name: Not on file    Number of children: Not on file    Years of education: Not on file    Highest education level: Not on file   Occupational History    Not on file   Tobacco Use    Smoking status: Never Smoker    Smokeless tobacco: Never Used   Vaping Use    Vaping Use: Never used   Substance and Sexual Activity    Alcohol use: No     Alcohol/week: 0.0 standard drinks    Drug use: Never    Sexual activity: Not on file   Other Topics Concern    Not on file   Social History Narrative    Not on file     Social Determinants of Health     Financial Resource Strain:    Yuliana Suarez Difficulty of Paying Living Expenses: Not on file   Food Insecurity:     Worried About Running Out of Food in the Last Year: Not on file    Ran Out of Food in the Last Year: Not on file   Transportation Needs:     Lack of Transportation (Medical): Not on file    Lack of Transportation (Non-Medical): Not on file   Physical Activity:     Days of Exercise per Week: Not on file    Minutes of Exercise per Session: Not on file   Stress:     Feeling of Stress : Not on file   Social Connections:     Frequency of Communication with Friends and Family: Not on file    Frequency of Social Gatherings with Friends and Family: Not on file    Attends Worship Services: Not on file    Active Member of [address] [place] [address] [employer] or Organizations: Not on file    Attends Club or Organization Meetings: Not on file    Marital Status: Not on file   Intimate Partner Violence:     Fear of Current or Ex-Partner: Not on file    Emotionally Abused: Not on file    Physically Abused: Not on file    Sexually Abused: Not on file   Housing Stability:     Unable to Pay for Housing in the Last Year: Not on file    Number of [place] in the Last Year: Not on file    Unstable Housing in the Last Year: Not on file       Vitals:    03/14/22 1440   BP: 118/80   Pulse: 103   Temp: 98.1 °F (36.7 °C)   SpO2: 98%         Wt Readings from Last 3 Encounters:   03/14/22 266 lb (120.7 kg)   10/07/21 269 lb (122 kg) (>99 %, Z= 2.68)*   08/06/21 271 lb (122.9 kg) (>99 %, Z= 2.71)*     * Growth percentiles are based on CDC (Boys, 2-20 Years) data.          BP Readings from Last 3 Encounters:   03/14/22 118/80   10/07/21 118/82   08/06/21 108/72       Hematology and Coagulation  Lab Results   Component Value Date    WBC 7.9 08/06/2021    RBC 4.83 08/06/2021    HGB 13.9 08/06/2021    HCT 42.4 08/06/2021    MCV 87.8 08/06/2021    MCH 28.8 08/06/2021    MCHC 32.8 08/06/2021    RDW 13.7 08/06/2021     08/06/2021    MPV 10.1 08/06/2021       Chemistries  Lab Results   Component Value Date     08/06/2021    K 4.0 08/06/2021     08/06/2021    CO2 25 08/06/2021    BUN 16 10/16/2018    CREATININE 1.09 10/16/2018    AST 22 03/05/2019    ALT 31 03/05/2019     Lab Results   Component Value Date    ALT 31 03/05/2019    AST 22 03/05/2019     Lab Results   Component Value Date    AST 22 03/05/2019    ALT 31 03/05/2019     Lab Results   Component Value Date    PHRPFKDR68 1066 09/29/2016         Drug Levels  Lab Results   Component Value Date    VALPROATE 73 08/06/2021    VALPROATE 54 02/04/2021           Review of Systems   Constitutional: Negative for chills, diaphoresis, fatigue and fever. HENT: Negative. Negative for congestion and sore throat. Eyes: Negative. Respiratory: Negative. Negative for cough. Cardiovascular: Negative. Negative for chest pain and palpitations. Gastrointestinal: Negative. Negative for abdominal pain, anorexia, change in bowel habit, diarrhea, nausea and vomiting. Endocrine: Negative. Genitourinary: Negative. Musculoskeletal: Negative. Negative for arthralgias, joint swelling, myalgias and neck pain. Skin: Negative. Negative for rash. Allergic/Immunologic: Negative. Neurological: Positive for seizures. Negative for dizziness, vertigo, tremors, syncope, facial asymmetry, speech difficulty, weakness, light-headedness, numbness and headaches. Hematological: Negative. Psychiatric/Behavioral: Positive for behavioral problems and decreased concentration. Negative for agitation, confusion, dysphoric mood, hallucinations, self-injury and suicidal ideas. The patient is not nervous/anxious. Objective:   Physical Exam  Constitutional:       Appearance: He is well-developed. HENT:      Head: Normocephalic and atraumatic. No raccoon eyes or Calixto's sign.       Right Ear: External ear normal.      Left Ear: External ear normal.      Nose: Nose normal.   Eyes:      Conjunctiva/sclera: Conjunctivae normal. Pupils: Pupils are equal, round, and reactive to light. Neck:      Thyroid: No thyroid mass or thyromegaly. Vascular: No carotid bruit. Trachea: No tracheal deviation. Meningeal: Brudzinski's sign and Kernig's sign absent. Cardiovascular:      Rate and Rhythm: Normal rate and regular rhythm. Pulmonary:      Effort: Pulmonary effort is normal.   Musculoskeletal:         General: No tenderness. Cervical back: Normal range of motion and neck supple. No rigidity. No muscular tenderness. Normal range of motion. Skin:     General: Skin is warm. Coloration: Skin is not pale. Findings: No erythema or rash. Nails: There is no clubbing. Psychiatric:         Attention and Perception: He is attentive. Mood and Affect: Mood is not anxious or depressed. Affect is not labile, blunt or inappropriate. Speech: He is communicative. Speech is not rapid and pressured, delayed, slurred or tangential.         Behavior: Behavior is not agitated, slowed, aggressive, withdrawn, hyperactive or combative. Behavior is cooperative. Thought Content: Thought content is not paranoid or delusional. Thought content does not include homicidal or suicidal ideation. Thought content does not include homicidal or suicidal plan. Cognition and Memory: Memory is not impaired. He does not exhibit impaired recent memory or impaired remote memory. Judgment: Judgment is not impulsive or inappropriate. NEUROLOGICAL EXAMINATION :    A) MENTAL STATUS:                   Alert and  oriented  To time, place  And  Person. No Aphasia. No  Dysarthria. Able   To  Follow   SIMPLE      commands without   Any  Difficulty. No right  To left confusion. Normal  Speech  And language function.                    Insight and  Judgment ,Fund  Of  Knowledge   within normal limits                Recent  And  Remote memory within normal limits                Attention &Concentration are within normal limits                                                     B) CRANIAL NERVES :             2 CN : Visual  Acuity  And  Visual fields  within normal limits                        Fundi  Could  Not  Be  Could  Not  Be  Evaluated. 3,4,6 CN : Both  Pupils are   Reactive and  Equal.                            Extraocular   Movements  Are  Intact. No  Nystagmus. No  DELMY. No  Afferent  Pupillary  Defect noted. 5 CN :  Normal  Facial sensations and Corneal  Reflexes           7 CN :  Normal  Facial  Symmetry  And  Strength. No facial  Weakness. 8 CN :  Hearing  Appears within normal limits          9, 10 CN: Normal spontaneous, reflex palate movements         11 CN:   Normal  Shoulder shrug and  Strength         12 CN :   Normal  Tongue movements and  Tongue  In midline                        No tongue   Fasciculations or atrophy           C) MOTOR  EXAM:                 Strength  In upper  And  Lower extremities   within normal limits               No  Drift. No  Atrophy               Rapid alternating  And  repetitions  Movements  within normal limits               Muscle  Tone  In upper  And  Lower  Extremities  Normal                No rigidity. No  Spasticity. Bradykinesia   Absent                 No  Asterixis. Sustention  Tremor , Resting  Tremor   absent                    No other  Abnormal  Movements noted           D) SENSORY :               light touch, pinprick, position  And  Vibration  within normal limits        E) REFLEXES:                   Deep  Tendon  Reflexes normal                    No pathological  Reflexes  Bilaterally.                                     F) COORDINATION  AND  GAIT :                                Station and  Gait  normal                                        Romberg's test negative Ataxia negative      Assessment:           Patient Active Problem List   Diagnosis    Attention deficit hyperactivity disorder    [facility]    Abnormal EEG    [address]onic seizures ([address]    Behavioral and emotional disorder with onset in childhood    Epilepsy ([address]    Learning difficulty    Complex partial epileptic seizure ([address]    Absence seizure ([address]    Seizures ([address]           Plan:           VISITING DIAGNOSIS:          ICD-10-CM    1. Partial symptomatic epilepsy with complex partial seizures, not intractable, without status epilepticus ([address]  G40.209    2. Seizures (HCC)  R56.9 divalproex (DEPAKOTE) 500 MG DR tablet     zonisamide (ZONEGRAN) 100 MG capsule   3. Learning difficulty  F81.9    4. Attention deficit hyperactivity disorder (ADHD), predominantly inattentive type  F90.0    5. Abnormal EEG  R94.01    6. Febrile seizures ([address]  R56.00    7. Behavioral and emotional disorder with onset in childhood  F98.9               CONCERNS   &   INCREASED   RISK   FOR           *  SEIZURE  ACTIVITY,  EPILEPSY           *        VARIOUS  RISK   FACTORS   WERE  REVIEWED   AND   DISCUSSED. *  PATIENT   HAS  MULTIPLE   MEDICAL, MENTAL HEALTH  &    NEUROLOGICAL   PROBLEMS . PATIENT'S   MANAGEMENT  IS  CHALLENGING. PLAN:       Sukhjinder Reyna  Of  The  Diagnoses,  The  Management & Treatment  Options           AND    Care  plan  Were        Reviewed and   Discussed   With  patient and   HIS  MOTHER    . * Goals  And  Expectations  Of  The  Therapy  Discussed   And  Reviewed. *   Benefits   And   Side  Effect  Profile  Of  Medication/s   Were   Discussed             * Need   For  Further   Follow up For  The  Various  Problems  Were discussed. * Results  Of  The  Previous  Diagnostic tests were reviewed and questions answered. patient and father  understand the same.                  Medical  Decision  Making  Was  Made  Based on the   Complexity  Of Above  Mentioned  Diagnoses,        Data reviewed   & diagnostic  Tests  Reviewed,  Risk  Of  Significant   Co morbidities and complicating   Factors. Medical  Decision  Was   High  Complexity  Due   To  The  Patient's  Multiple  Symptoms,      Complex  Treatment  Regimen,  Multiple medications and   Risk  Of   Side  Effects,  Difficulty  In  Medication  Management      And  Diagnostic  Challenges   In  View  Of  The  Associated   Co  Morbid  Conditions   And  Problems. *    SUPERVISED   CARE      *   BE  CAREFUL  WITH  ACTIVITIES             *   ADEQUATE   FLUID  INTAKE   AND  ELECTROLYTE  BALANCE             * KEEP  DAIRY  OF   THE  NEUROLOGICAL  SYMPTOMS        RECORDING THE    DURATION  AND  FREQUENCY. *  AVOID    CONDITIONS  AND  FACTORS   THAT  MAKE   NEUROLOGICAL  SYMPTOMS  WORSE. *   SEIZURE  PRECAUTIONS.  -     DISCUSSED                    A)  Avoid  Working  At   [employer] B)  Avoid  Working  With  Heavy machinery. C)  Avoid   Swimming,  Climbing  A  Ladder   Unattended. D)  Avoid   Driving   If  You   Have  A  Seizure. E)  Must   Be  Seizure  Free   For  At   Least   6 months,  Before   You  Can drive. F) Some times  Your  May  Feel  Seizure coming  Before  It  Begins. You  May feel               Strange smell or funny  Feeling  In  Your  Stomach,  Which is  Called   Aura. TIPS  TO  REDUCE/ PREVENT  SEIZURES         1. Take  Your  Anti seizure  Medications   As   Recommended. 2. Get   Enough   Sleep. Sleep  Deprivation   Can  Trigger  A  Seizure. 3. If   You   Have  A fever,  Treat  It  At  Once,  And  Contact   Your  Primary  Care Providers. 4. Avoid   Alcohol. 5. Avoid  Flashing  Lights,  Loud  Noises and  TV  And  Video  Games,             As   These  May  Trigger   Your  Seizures       6. Control  Your  Stress  And   Have  Adequate  Rest.       7.   If  You  Feel  A  Seizure  Coming   On :             A) warn people  Who  Are  With  You           B)  Make  Sure  There  Are  No  Sharp or  Hard  Objects  Around you. C)  Lay down  On  Your  Side  And  Relax. *  TO  MAINTAIN  REGULAR  SLEEP  WAKE  CYCLES. *   TO  HAVE  ADEQUATE  REST  AND   SLEEP    HOURS. *      WEIGHT   LOSS. *    AVOID  ANY USAGE OF                   TOBACCO,  EXCESSIVE  ALCOHOL  AND   ILLEGAL   SUBSTANCES          *  CONTINUE MEDICATIONS PRESCRIBED      AS    RECOMMENDED       *   Compliance   With  Medications   And  Instructions          * CURRENTLY  TOLERATING  THE  PRESCRIBED   MEDICATIONS. WITHOUT  ANY  SIGNIFICANT  SIDE  EFFECTS   &  GETTING BENEFIT. *  May   Use  Pill  Box,    If  Needed        *  MEDICATIONS TO AVOID  :    WELLBUTRIN,  ULTRAM         *    Prophylactic  Use   Of     Vitamin   B   Complex,  Folic  Acid,    Vitamin  B12        Multivitamin   Tablet  Daily    Supplementations   Over  The  Counter  Discussed           *  PATIENT  IS  ALSO   COUNSELED   TO  KEEP    ACTIVITIES         A)   SIMPLE      B)  ORGANIZED      C)  WRITE   DOWN                          *    PATIENT   AND  HIS  MOTHER  REQUESTED      COMPLETION  OF                         BMV     FORM     FOR   HIS  CONTINUED   DRIVING  PRIVILEGES                                 -     COMPLETED    ON    10/07/2021                       *             PATIENT  AND  HIS  MOTHER   DENIED  ANY                     SEIZURE   RECURRENCE      AND   ANY    NEW                         NEUROLOGICAL    CONCERNS.                           Orders Placed This Encounter   Procedures    CBC    Electrolyte Panel    Valproic Acid Level, Total    Zonisamide Level    TSH    Vitamin B12 & Folate             Orders Placed This Encounter   Medications    divalproex (DEPAKOTE) 500 MG DR tablet     Sig: ONE TABLET     TWICE  DAILY     Dispense:  60 tablet     Refill:  5    zonisamide (ZONEGRAN) 100 MG capsule     Sig: Take 1 capsule by mouth 2 times daily     Dispense:  60 capsule     Refill:  5                       *PATIENT   TO  FOLLOW  UP  WITH   PRIMARY  CARE   AND   OTHER  CONSULTANTS  AS  BEFORE.           *TO  FOLLOW  WITH   MENTAL  HEALTH  PROFESSIONALS ,  INCLUDING            PSYCHOLOGICAL  COUNSELING   AND  PSYCHIATRIC  EVALUTIONS            *  Maintain   Healthy  Life Style    With   Periodic  Monitoring  Of      Any  Medical  Conditions  Including   Elevated  Blood  Pressure,  Lipid  Profile,     Blood  Sugar levels  And   Heart  Disease. *   Period   Screening  For  Cancers  Involving  Breast,  Colon,    Prostate, lungs  And  Other  Organs  As  Applicable,  In consultation   With  Your  Primary Care Providers. * Second  Neurological  Opinion  And  Evaluations  In  [facility] AND [facility]  Setting  If  Patient  Is  Interested. *  If  The  Patient remains  Neurologically  Stable   Return   To  [facility] Neurology Department       IN  3-6   MONTHS  TIME   FOR  FURTHER  FOLLOW UP.                   *  If   There is  Any  Significant  Worsening   Of  Current  Symptoms  And  Or  If patient  Develops       Any additional  New  Neurological  Symptoms  Or  Significant  Concerns   Should  Call  911 or      Go  To  Emergency  Department  For  Further  Immediate  Evaluation. *   The  Neurological   Findings,  Possible  Diagnosis,  Differential diagnoses   And  Options      For    Further   Investigations   And  management   Are  Discussed  Comprehensively. Medications   And  Prescription   Risks  And  Side effects  Are   Also  Discussed.              The  Above  Were Reviewed  With  patient and   HIS  MOTHER    And     questions  Answered  In  Detail. More   Than   50% of face  To face Time   Was  Spent  On  Counseling   And   Coordination  Of  Care       Of   Patient's multiple   Neurological  Problems   And   Comorbid  Medical   Conditions. Electronically signed by Genevieve Colbert MD.  [facility]      Board Certified in  Neurology &  In  [facility] of Psychiatry and Neurology (ABPN)      DISCLAIMER:   Although every effort was made to ensure the accuracy of this  electronic transcription, some errors in transcription may have occurred. GENERAL PATIENT INSTRUCTIONS:     A Healthy Lifestyle: Care Instructions  Your Care Instructions  A healthy lifestyle can help you feel good, stay at a healthy weight, and have plenty of energy for both work and play. A healthy lifestyle is something you can share with your whole family. A healthy lifestyle also can lower your risk for serious health problems, such as high blood pressure, heart disease, and diabetes. You can follow a few steps listed below to improve your health and the health of your family. Follow-up care is a key part of your treatment and safety. Be sure to make and go to all appointments, and call your doctor if you are having problems. Its also a good idea to know your test results and keep a list of the medicines you take. How can you care for yourself at home? Do not eat too much sugar, fat, or fast foods. You can still have dessert and treats now and then. The goal is moderation. Start small to improve your eating habits. Pay attention to portion sizes, drink less juice and soda pop, and eat more fruits and vegetables. Eat a healthy amount of food. A 3-ounce serving of meat, for example, is about the size of a deck of cards. Fill the rest of your plate with vegetables and whole grains. Limit the amount of soda and sports drinks you have every day.  Drink lean meats and dairy, and whole grains. Include your family in your fitness plan. Most people think of activities such as jogging or tennis as the way to fitness, but there are many ways you and your family can be more active. Anything that makes you breathe hard and gets your heart pumping is exercise. Here are some tips:  Walk to do errands or to take your child to school or the bus. Go for a family bike ride after dinner instead of watching TV. Where can you learn more? Go to https://[employer] Anal[employer].Stamped. org and sign in to your [employer] account. Enter R900 in the Trigger[employer] box to learn more about \"A Healthy Lifestyle: Care Instructions. \"     If you do not have an account, please click on the \"Sign Up Now\" link. Current as of: July 26, 2016  Content Version: 11.2  © 5857-5444 [employer]. Care instructions adapted under license by [facility] ([facility]). If you have questions about a medical condition or this instruction, always ask your healthcare professional. [address] any warranty or liability for your use of this information.

## 2022-03-14 NOTE — TELEPHONE ENCOUNTER
This writer contacted patient in re: missed appointment today with Dr. Norberto Singh - r/s to today at 2 PM.

## 2022-03-14 NOTE — PATIENT INSTRUCTIONS
*   SEIZURE  PRECAUTIONS. A)  Avoid  Working  At   [employer] B)  Avoid  Working  With  Heavy machinery. C)  Avoid   Swimming,  Climbing  A  Ladder   Unattended. D)  Avoid   Driving   If  You   Have  A  Seizure. E)  Must   Be  Seizure  Free   For  At   Least   6 months,  Before   You  Can drive. F) Some times  Your  May  Feel  Seizure coming  Before  It  Begins. You  May feel             Strange smell or funny  Feeling  In  Your  Stomach,  Which is  Called   Aura. TIPS  TO  REDUCE/ PREVENT  SEIZURES         1. Take  Your  Anti seizure  Medications   As   Recommended. 2. Get   Enough   Sleep. Sleep  Deprivation   Can  Trigger  A  Seizure. 3. If   You   Have  A fever,  Treat  It  At  Once,  And  Contact   Your  Primary  Care Providers. 4. Avoid   Alcohol. 5. Avoid  Flashing  Lights,  Loud  Noises and  TV  And  Video  Games,           As   These  May  Trigger   Your  Seizures       6. Control  Your  Stress  And   Have  Adequate  Rest.       7.   If  You  Feel  A  Seizure  Coming   On :           A) warn people  Who  Are  With  You           B)  Make  Sure  There  Are  No  Sharp or  Hard  Objects  Around you. C)  Lay down  On  Your  Side  And  Relax. *   ADEQUATE   FLUID  INTAKE   AND  ELECTROLYTE  BALANCE             * KEEP  DAIRY  OF   THE  NEUROLOGICAL  SYMPTOMS          *  TO  MAINTAIN  REGULAR  SLEEP  WAKE  CYCLES.      *   TO  HAVE  ADEQUATE  REST  AND   SLEEP    HOURS.          *    AVOID  USAGE OF   TOBACCO,  EXCESSIVE  ALCOHOL                AND   ILLEGAL   SUBSTANCES,  IF  ANY          *  Maintain   Healthy  Life Style    With   Periodic  Monitoring Of         Any  Medical  Conditions  Including   Elevated  Blood  Pressure,  Lipid  Profile,       Blood  Sugar levels  And   Heart  Disease. *   Period   Screening  For  Cancers  Involving  Breast,  Colon,         Lungs  And  Other  Organs  As  Applicable,           In consultation   With  Your  Primary Care Providers. *  If   There is  Any  Significant  Worsening   Of  Current  Symptoms  And             Or  If    Any additional  New  Neurological  Symptoms  and          Significant  Concerns   Should  Call  911 or  Go  To  Emergency  Department            For  Further  Immediate  Evaluation.

## 2022-03-17 LAB — ZONISAMIDE: 10 UG/ML (ref 10–40)

## 2022-09-14 ENCOUNTER — HOSPITAL ENCOUNTER (OUTPATIENT)
Dept: LAB | Age: 22
Discharge: HOME | End: 2022-09-14
Payer: COMMERCIAL

## 2022-09-14 ENCOUNTER — OFFICE VISIT (OUTPATIENT)
Dept: NEUROLOGY | Age: 22
End: 2022-09-14
Payer: COMMERCIAL

## 2022-09-14 VITALS
HEIGHT: 71 IN | SYSTOLIC BLOOD PRESSURE: 110 MMHG | HEART RATE: 73 BPM | DIASTOLIC BLOOD PRESSURE: 80 MMHG | BODY MASS INDEX: 40.18 KG/M2 | OXYGEN SATURATION: 97 % | WEIGHT: 287 LBS

## 2022-09-14 DIAGNOSIS — G40.A09 ABSENCE SEIZURE (HCC): ICD-10-CM

## 2022-09-14 DIAGNOSIS — R56.9 SEIZURES (HCC): ICD-10-CM

## 2022-09-14 DIAGNOSIS — G40.209 PARTIAL SYMPTOMATIC EPILEPSY WITH COMPLEX PARTIAL SEIZURES, NOT INTRACTABLE, WITHOUT STATUS EPILEPTICUS (HCC): Primary | ICD-10-CM

## 2022-09-14 DIAGNOSIS — G40.409 OTHER GENERALIZED EPILEPSY, NOT INTRACTABLE, WITHOUT STATUS EPILEPTICUS (HCC): ICD-10-CM

## 2022-09-14 DIAGNOSIS — G40.409 TONIC CLONIC SEIZURES (HCC): ICD-10-CM

## 2022-09-14 DIAGNOSIS — R94.01 ABNORMAL EEG: ICD-10-CM

## 2022-09-14 DIAGNOSIS — R56.00 FEBRILE SEIZURES (HCC): ICD-10-CM

## 2022-09-14 DIAGNOSIS — F98.9 BEHAVIORAL AND EMOTIONAL DISORDER WITH ONSET IN CHILDHOOD: ICD-10-CM

## 2022-09-14 DIAGNOSIS — F81.9 LEARNING DIFFICULTY: ICD-10-CM

## 2022-09-14 LAB
ANION GAP SERPL CALCULATED.3IONS-SCNC: 7 MMOL/L (ref 9–17)
CHLORIDE BLD-SCNC: 105 MMOL/L (ref 98–107)
CO2: 28 MMOL/L (ref 20–31)
HCT VFR BLD CALC: 45 % (ref 40.7–50.3)
HEMOGLOBIN: 14.7 G/DL (ref 13–17)
MCH RBC QN AUTO: 28.4 PG (ref 25.2–33.5)
MCHC RBC AUTO-ENTMCNC: 32.7 G/DL (ref 25.2–33.5)
MCV RBC AUTO: 87 FL (ref 82.6–102.9)
NRBC AUTOMATED: 0 PER 100 WBC
PDW BLD-RTO: 14 % (ref 11.8–14.4)
PLATELET # BLD: 259 K/UL (ref 138–453)
PMV BLD AUTO: 10.1 FL (ref 8.1–13.5)
POTASSIUM SERPL-SCNC: 5.3 MMOL/L (ref 3.7–5.3)
RBC # BLD: 5.17 M/UL (ref 4.21–5.77)
SODIUM BLD-SCNC: 140 MMOL/L (ref 135–144)
VALPROIC ACID LEVEL: 37 UG/ML (ref 50–125)
WBC # BLD: 9.1 K/UL (ref 4.5–13.5)

## 2022-09-14 PROCEDURE — 80051 ELECTROLYTE PANEL: CPT

## 2022-09-14 PROCEDURE — G8427 DOCREV CUR MEDS BY ELIG CLIN: HCPCS | Performed by: PSYCHIATRY & NEUROLOGY

## 2022-09-14 PROCEDURE — 1036F TOBACCO NON-USER: CPT | Performed by: PSYCHIATRY & NEUROLOGY

## 2022-09-14 PROCEDURE — 80164 ASSAY DIPROPYLACETIC ACD TOT: CPT

## 2022-09-14 PROCEDURE — 80203 DRUG SCREEN QUANT ZONISAMIDE: CPT

## 2022-09-14 PROCEDURE — G8417 CALC BMI ABV UP PARAM F/U: HCPCS | Performed by: PSYCHIATRY & NEUROLOGY

## 2022-09-14 PROCEDURE — 36415 COLL VENOUS BLD VENIPUNCTURE: CPT

## 2022-09-14 PROCEDURE — 85027 COMPLETE CBC AUTOMATED: CPT

## 2022-09-14 PROCEDURE — 99214 OFFICE O/P EST MOD 30 MIN: CPT | Performed by: PSYCHIATRY & NEUROLOGY

## 2022-09-14 RX ORDER — DOXYCYCLINE HYCLATE 100 MG/1
CAPSULE ORAL
COMMUNITY
Start: 2022-08-15

## 2022-09-14 RX ORDER — ZONISAMIDE 100 MG/1
100 CAPSULE ORAL 2 TIMES DAILY
Qty: 60 CAPSULE | Refills: 5 | Status: SHIPPED | OUTPATIENT
Start: 2022-09-14

## 2022-09-14 RX ORDER — DIVALPROEX SODIUM 500 MG/1
TABLET, DELAYED RELEASE ORAL
Qty: 60 TABLET | Refills: 5 | Status: SHIPPED | OUTPATIENT
Start: 2022-09-14

## 2022-09-14 ASSESSMENT — ENCOUNTER SYMPTOMS
EYES NEGATIVE: 1
VOMITING: 0
RESPIRATORY NEGATIVE: 1
ALLERGIC/IMMUNOLOGIC NEGATIVE: 1
NAUSEA: 0
COUGH: 0
DIARRHEA: 0
GASTROINTESTINAL NEGATIVE: 1
CHANGE IN BOWEL HABIT: 0
ABDOMINAL PAIN: 0
VISUAL CHANGE: 0
SWOLLEN GLANDS: 0
SORE THROAT: 0

## 2022-09-14 NOTE — PROGRESS NOTES
Subjective:        Patient ID: Mary Carrasco is a 22 y.o. RIGHT   HANDED   . Seizures  This is a chronic problem. Episode onset: IN  CHILD [place]. The problem has been resolved. Pertinent negatives include no abdominal pain, anorexia, arthralgias, change in bowel habit, chest pain, chills, congestion, coughing, diaphoresis, fatigue, fever, headaches, joint swelling, myalgias, nausea, neck pain, numbness, rash, sore throat, swollen glands, urinary symptoms, vertigo, visual change, vomiting or weakness. Nothing aggravates the symptoms. Treatments tried: ANTI  EPILEPTIC MEDICATIONS. The treatment provided significant relief. History obtained from  The patient and  HIS     BIOLOGICAL      MOTHER         and other  available medical records were  Also  reviewed.               1)      H/O  KNOWN  CHILDHOOD ONSET  EPILEPSY                 FIRST    SEIZURE    REPORTED   TO   BE  AT   THE  AGE                        OF   2 1/2   YEARS  OLD    AS  PER  HIS  MOTHER                               -   STABLE          2)       PREVIOUS    H/O    FEBRILE SEIZURES                       -  NO  RECURRENCE                3)        PATIENT'S   PREVIOUS    SEIZURE  TYPES  INCLUDE :                  GTC  SEIZURES,  STARING  EPISODES,                 ABSENCE  AND  PARTIAL  COMPLEX  SEIZURES                        -     WELL  CONTROLLED                4)       FAMILY   H/O  SEIZURE  DISORDER/  EPILEPSY          5)       PREVIOUS     H/O  ADHD                         -   STABLE                PATIENT  TO  FOLLOW  WITH  MENTAL  HEALTH  PROFESSIONALS              6)        KNOWN     H/O   LEARNING  DIFFICULTY                      -  IMPROVED                    PATIENT   COMPLETED       12  TH  GRADE                         WITH       VOCATIONAL  EDUCATION            7)       PREVIOUS  H/O  BEHAVIORAL  PROBLEMS                    -  WELL  CONTROLLED          8)         LAST  SEIZURE  ACTIVITY   REPORTED  TO  BE  IN  December 2014            9)       HAS   BEEN      ON  DEPAKOTE  AND  ZONEGRAN                TOLERATING  THE SAME  WITHOUT  ANY  SIDE  EFFECTS            10)       EEG   IN  December 2017    SHOWED                   NO  EPILEPTIFORM  FEATURES. 11)         PATIENT  NEUROLOGICALLY   STABLE                      WITHOUT  ANY  RECURRENCE  OF SEIZURE  ACTIVITY. NO  MEMORY PROBLEMS. NO  CONFUSIONAL EPISODES. NO   STARING  EPISODES            12)       PATIENT     GOT     DRIVERS  LICENSE   PERMIT  IN    7/[id number]/[id number]                        PATIENT      SHOULD   CONTINUE  :                      A)    ANTI  EPILEPTIC  MEDICATIONS  PROPERLY,                     B)    FOLLOW  SEIZURE  PRECAUTIONS                         -   DISCUSSED   WITH   PATIENT  AND  HIS  MOTHER                                       13)      PATIENT   AND  HIS  MOTHER  REQUESTED      COMPLETION  OF                         [facility]     FORM     FOR   HIS  CONTINUED   DRIVING  PRIVILEGES                                 -     COMPLETED    ON    10/07/2021            14)      ANTI  EPILEPTIC  MEDICATION  LEVELS    IN   AUG.  2021                      MARCH  2022      ARE    WITH  IN  NORMAL  LIMITS. 15)             PATIENT  AND  HIS  MOTHER   DENIED  ANY                     SEIZURE   RECURRENCE      AND   ANY    NEW                         NEUROLOGICAL    CONCERNS. The  Duration,  Quality,  Severity,  Location,  Timing,  Context,  Modifying  Factors   Of   The   Chief   Complaint       And  Present  Illness   Was   Reviewed   In   Chronological   [place].                                        Patient   Indicates   The  Presence   And  The  Absence  Of  The  Following  Associated  And         Additional  Neurological    Symptoms:                                Balance  And coordination problems  absent           Gait problems     absent PATIENT  CARE   COORDINATION             Past Medical History:   Diagnosis Date    Abnormal EEG     Absence seizure ([facility] [address]     Attention deficit hyperactivity disorder     Behavioral problems     Complex partial epileptic seizure ([facility] [address]     Epilepsy ([facility] [address]     Febrile seizures ([facility] [address] last seizure in July 2010    Learning difficulty     Tonic clonic seizures ([facility] [address]                   Past Surgical History:   Procedure Laterality Date    CYST REMOVAL  04/2021    Tailbone    TONSILLECTOMY AND ADENOIDECTOMY  2003                 Current Outpatient Medications   Medication Sig Dispense Refill    doxycycline hyclate (VIBRAMYCIN) 100 MG capsule       divalproex (DEPAKOTE) 500 MG DR tablet ONE TABLET     TWICE  DAILY 60 tablet 5    zonisamide (ZONEGRAN) 100 MG capsule Take 1 capsule by mouth 2 times daily 60 capsule 5    vitamin D (CHOLECALCIFEROL) 25 MCG (1000 UT) TABS tablet Take 1,000 Units by mouth daily      Multiple Vitamins-Minerals (MULTIVITAL) CHEW Take  by mouth daily. loratadine (CLARITIN) 10 MG tablet Take 10 mg by mouth as needed. No current facility-administered medications for this visit.            No Known Allergies            Family History   Problem Relation Age of Onset    Depression Mother     Asthma Mother     Neuropathy Mother     Mental Illness Mother     Seizures Neg Hx              Social History     Socioeconomic History    Marital status: Single     Spouse name: Not on file    Number of children: Not on file    Years of education: Not on file    Highest education level: Not on file   Occupational History    Not on file   Tobacco Use    Smoking status: Never    Smokeless tobacco: Never   Vaping Use    Vaping Use: Never used   Substance and Sexual Activity    Alcohol use: No     Alcohol/week: 0.0 standard drinks    Drug use: Never    Sexual activity: Not on file   Other Topics Concern    Not on file   Social History Narrative    Not on file     Social Determinants of Health     Financial Resource Strain: Not on file   Food Insecurity: Not on file   Transportation Needs: Not on file   Physical Activity: Not on file   Stress: Not on file   Social Connections: Not on file   Intimate Partner Violence: Not on file   Housing Stability: Not on file       Vitals:    09/14/22 0919   BP: 110/80   Pulse: 73   SpO2: 97%         Wt Readings from Last 3 Encounters:   09/14/22 287 lb (130.2 kg)   03/14/22 266 lb (120.7 kg)   10/07/21 269 lb (122 kg) (>99 %, Z= 2.68)*     * Growth percentiles are based on [facility] (Boys, 2-20 Years) data. BP Readings from Last 3 Encounters:   09/14/22 110/80   03/14/22 118/80   10/07/21 118/82       Hematology and Coagulation  Lab Results   Component Value Date/Time    WBC 9.1 09/14/2022 09:59 AM    RBC 5.17 09/14/2022 09:59 AM    HGB 14.7 09/14/2022 09:59 AM    HCT 45.0 09/14/2022 09:59 AM    MCV 87.0 09/14/2022 09:59 AM    MCH 28.4 09/14/2022 09:59 AM    MCHC 32.7 09/14/2022 09:59 AM    RDW 14.0 09/14/2022 09:59 AM     09/14/2022 09:59 AM    MPV 10.1 09/14/2022 09:59 AM       Chemistries  Lab Results   Component Value Date/Time     09/14/2022 09:59 AM    K 5.3 09/14/2022 09:59 AM     09/14/2022 09:59 AM    CO2 28 09/14/2022 09:59 AM    BUN 16 10/16/2018 04:35 PM    CREATININE 1.09 10/16/2018 04:35 PM    AST 22 03/05/2019 04:06 PM    ALT 31 03/05/2019 04:06 PM     Lab Results   Component Value Date/Time    ALT 31 03/05/2019 04:06 PM    AST 22 03/05/2019 04:06 PM     Lab Results   Component Value Date/Time    AST 22 03/05/2019 04:06 PM    ALT 31 03/05/2019 04:06 PM     Lab Results   Component Value Date/Time    [id number] 1194 03/14/2022 03:19 PM         Drug Levels  Lab Results   Component Value Date/Time    VALPROATE 37 09/14/2022 09:59 AM    VALPROATE 56 03/14/2022 03:19 PM           Review of Systems   Constitutional:  Negative for chills, diaphoresis, fatigue and fever. HENT: Negative. Negative for congestion and sore throat. Eyes: Negative. Respiratory: Negative. Negative for cough. Cardiovascular: Negative. Negative for chest pain and palpitations. Gastrointestinal: Negative. Negative for abdominal pain, anorexia, change in bowel habit, diarrhea, nausea and vomiting. Endocrine: Negative. Genitourinary: Negative. Musculoskeletal: Negative. Negative for arthralgias, joint swelling, myalgias and neck pain. Skin: Negative. Negative for rash. Allergic/Immunologic: Negative. Neurological:  Positive for seizures. Negative for dizziness, vertigo, tremors, syncope, facial asymmetry, speech difficulty, weakness, light-headedness, numbness and headaches. Hematological: Negative. Psychiatric/Behavioral:  Positive for behavioral problems and decreased concentration. Negative for agitation, confusion, dysphoric mood, hallucinations, self-injury and suicidal ideas. The patient is not nervous/anxious. Objective:   Physical Exam  Constitutional:       Appearance: He is well-developed. HENT:      Head: Normocephalic and atraumatic. No raccoon eyes or Calixto's sign. Right Ear: External ear normal.      Left Ear: External ear normal.      Nose: Nose normal.   Eyes:      Conjunctiva/sclera: Conjunctivae normal.      Pupils: Pupils are equal, round, and reactive to light. Neck:      Thyroid: No thyroid mass or thyromegaly. Vascular: No carotid bruit. Trachea: No tracheal deviation. Meningeal: Brudzinski's sign and Kernig's sign absent. Cardiovascular:      Rate and Rhythm: Normal rate and regular rhythm. Pulmonary:      Effort: Pulmonary effort is normal.   Musculoskeletal:         General: No tenderness. Cervical back: Normal range of motion and neck supple. No rigidity. No muscular tenderness. Normal range of motion. Skin:     General: Skin is warm. Coloration: Skin is not pale. Findings: No erythema or rash. Nails: There is no clubbing.    Psychiatric:         Attention and Perception: He is attentive. Mood and Affect: Mood is not anxious or depressed. Affect is not labile, blunt or inappropriate. Speech: He is communicative. Speech is not rapid and pressured, delayed, slurred or tangential.         Behavior: Behavior is not agitated, slowed, aggressive, withdrawn, hyperactive or combative. Behavior is cooperative. Thought Content: Thought content is not paranoid or delusional. Thought content does not include homicidal or suicidal ideation. Thought content does not include homicidal or suicidal plan. Cognition and Memory: Memory is not impaired. He does not exhibit impaired recent memory or impaired remote memory. Judgment: Judgment is not impulsive or inappropriate. NEUROLOGICAL EXAMINATION :    A) MENTAL STATUS:                   Alert and  oriented  To time, place  And  Person. No Aphasia. No  Dysarthria. Able   To  Follow   SIMPLE      commands without   Any  Difficulty. No right  To left confusion. Normal  Speech  And language function. Insight and  Judgment ,Fund  Of  Knowledge   within normal limits                Recent  And  Remote memory  within normal limits                Attention &Concentration are within normal limits                                                     B) CRANIAL NERVES :             2 CN : Visual  Acuity  And  Visual fields  within normal limits                        Fundi  Could  Not  Be  Could  Not  Be  Evaluated. 3,4,6 CN : Both  Pupils are   Reactive and  Equal.                            Extraocular   Movements  Are  Intact. No  Nystagmus. No  DELMY. No  Afferent  Pupillary  Defect noted. 5 CN :  Normal  Facial sensations and Corneal  Reflexes           7 CN :  Normal  Facial  Symmetry  And  Strength. No facial  Weakness.            8 CN :  Hearing  Appears within normal limits          9, 10 CN: Normal spontaneous, reflex palate movements         11 CN:   Normal  Shoulder shrug and  Strength         12 CN :   Normal  Tongue movements and  Tongue  In midline                        No tongue   Fasciculations or atrophy           C) MOTOR  EXAM:                 Strength  In upper  And  Lower extremities   within normal limits               No  Drift. No  Atrophy               Rapid alternating  And  repetitions  Movements  within normal limits               Muscle  Tone  In upper  And  Lower  Extremities  Normal                No rigidity. No  Spasticity. Bradykinesia   Absent                 No  Asterixis. Sustention  Tremor , Resting  Tremor   absent                    No other  Abnormal  Movements noted           D) SENSORY :               light touch, pinprick, position  And  Vibration  within normal limits        E) REFLEXES:                   Deep  Tendon  Reflexes normal                    No pathological  Reflexes  Bilaterally. F) COORDINATION  AND  GAIT :                                Station and  Gait  normal                                        Romberg's test negative                          Ataxia negative      Assessment:           Patient Active Problem List   Diagnosis    Attention deficit hyperactivity disorder    Febrile seizures (HCC)    Abnormal EEG    Tonic clonic seizures (HCC)    Behavioral and emotional disorder with onset in childhood    Epilepsy ([facility] [address]    Learning difficulty    Complex partial epileptic seizure ([facility] [address]    Absence seizure (HCC)    Seizures ([facility] [address]           Plan:           VISITING DIAGNOSIS:          ICD-10-CM    1. Partial symptomatic epilepsy with complex partial seizures, not intractable, without status epilepticus ([address]  G40.209       2.  Seizures (HCC)  R56.9 divalproex (DEPAKOTE) 500 MG DR tablet     zonisamide (ZONEGRAN) 100 MG capsule     CBC     Electrolyte Panel Valproic Acid Level, Total     Zonisamide Level      3. Absence seizure ([address]  G40. A09       4. Other generalized epilepsy, not intractable, without status epilepticus ([address]  G40.409       5. Learning difficulty  F81.9       6. Abnormal EEG  R94.01       7. Febrile seizures ([address]  R56.00       8. Behavioral and emotional disorder with onset in childhood  F98.9       9. Tonic clonic seizures (HCC)  G40.409                  CONCERNS   &   INCREASED   RISK   FOR           *  SEIZURE  ACTIVITY,  EPILEPSY           *        VARIOUS  RISK   FACTORS   WERE  REVIEWED   AND   DISCUSSED. *  PATIENT   HAS  MULTIPLE   MEDICAL, MENTAL HEALTH  &    NEUROLOGICAL   PROBLEMS . PATIENT'S   MANAGEMENT  IS  CHALLENGING. PLAN:       Lilibeth Dueñas  Of  The  Diagnoses,  The  Management & Treatment  Options           AND    Care  plan  Were        Reviewed and   Discussed   With  patient and   HIS  MOTHER    . * Goals  And  Expectations  Of  The  Therapy  Discussed   And  Reviewed. *   Benefits   And   Side  Effect  Profile  Of  Medication/s   Were   Discussed             * Need   For  Further   Follow up For  The  Various  Problems  Were discussed. * Results  Of  The  Previous  Diagnostic tests were reviewed and questions answered. patient and father  understand the same. Medical  Decision  Making  Was  Made  Based on the   Complexity  Of  Above  Mentioned  Diagnoses,        Data reviewed   & diagnostic  Tests  Reviewed,  Risk  Of  Significant   Co morbidities and complicating   Factors. Medical  Decision  Was   High  Complexity  Due   To  The  Patient's  Multiple  Symptoms,      Complex  Treatment  Regimen,  Multiple medications and   Risk  Of   Side  Effects,  Difficulty  In  Medication  Management      And  Diagnostic  Challenges   In  View  Of  The  Associated   Co  Morbid  Conditions   And  Problems.                 *    SUPERVISED   CARE      *   BE CAREFUL  WITH  ACTIVITIES             *   ADEQUATE   FLUID  INTAKE   AND  ELECTROLYTE  BALANCE             * KEEP  DAIRY  OF   THE  NEUROLOGICAL  SYMPTOMS        RECORDING THE    DURATION  AND  FREQUENCY. *  AVOID    CONDITIONS  AND  FACTORS   THAT  MAKE   NEUROLOGICAL  SYMPTOMS  WORSE. *   SEIZURE  PRECAUTIONS.  -     DISCUSSED                    A)  Avoid  Working  At   [employer] B)  Avoid  Working  With  Heavy machinery. C)  Avoid   Swimming,  Climbing  A  Ladder   Unattended. D)  Avoid   Driving   If  You   Have  A  Seizure. E)  Must   Be  Seizure  Free   For  At   Least   6 months,  Before   You  Can drive. F) Some times  Your  May  Feel  Seizure coming  Before  It  Begins. You  May feel               Strange smell or funny  Feeling  In  Your  Stomach,  Which is  Called   Aura. TIPS  TO  REDUCE/ PREVENT  SEIZURES         1. Take  Your  Anti seizure  Medications   As   Recommended. 2. Get   Enough   Sleep. Sleep  Deprivation   Can  Trigger  A  Seizure. 3. If   You   Have  A fever,  Treat  It  At  Once,  And  Contact   Your  Primary  Care Providers. 4. Avoid   Alcohol. 5. Avoid  Flashing  Lights,  Loud  Noises and  TV  And  Video  Games,             As   These  May  Trigger   Your  Seizures       6. Control  Your  Stress  And   Have  Adequate  Rest.       7.   If  You  Feel  A  Seizure  Coming   On :             A) warn people  Who  Are  With  You           B)  Make  Sure  There  Are  No  Sharp or  Hard  Objects  Around you. C)  Lay down  On  Your  Side  And  Relax. *  TO  MAINTAIN  REGULAR  SLEEP  WAKE  CYCLES.      *   TO  HAVE  ADEQUATE  REST AND   SLEEP    HOURS. *      WEIGHT   LOSS. *    AVOID  ANY USAGE OF                   TOBACCO,  EXCESSIVE  ALCOHOL  AND   ILLEGAL   SUBSTANCES          *  CONTINUE MEDICATIONS PRESCRIBED      AS    RECOMMENDED       *   Compliance   With  Medications   And  Instructions          * CURRENTLY  TOLERATING  THE  PRESCRIBED   MEDICATIONS. WITHOUT  ANY  SIGNIFICANT  SIDE  EFFECTS   &  GETTING BENEFIT. *  May   Use  Pill  Box,    If  Needed        *  MEDICATIONS TO AVOID  :    WELLBUTRIN,  ULTRAM         *    Prophylactic  Use   Of     Vitamin   B   Complex,  Folic  Acid,    Vitamin  B12        Multivitamin   Tablet  Daily    Supplementations   Over  The  Counter  Discussed           *  PATIENT  IS  ALSO   COUNSELED   TO  KEEP    ACTIVITIES         A)   SIMPLE      B)  ORGANIZED      C)  WRITE   DOWN                               *             PATIENT  AND  HIS  MOTHER   DENIED  ANY                     SEIZURE   RECURRENCE      AND   ANY    NEW                         NEUROLOGICAL    CONCERNS. Orders Placed This Encounter   Medications    divalproex (DEPAKOTE) 500 MG DR tablet     Sig: ONE TABLET     TWICE  DAILY     Dispense:  60 tablet     Refill:  5    zonisamide (ZONEGRAN) 100 MG capsule     Sig: Take 1 capsule by mouth 2 times daily     Dispense:  60 capsule     Refill:  5                       *PATIENT   TO  FOLLOW  UP  WITH   PRIMARY  CARE   AND   OTHER  CONSULTANTS  AS  BEFORE.           *TO  FOLLOW  WITH   MENTAL  HEALTH  PROFESSIONALS ,  INCLUDING            PSYCHOLOGICAL  COUNSELING   AND  PSYCHIATRIC  EVALUTIONS            *  Maintain   Healthy  Life Style    With   Periodic  Monitoring  Of      Any  Medical  Conditions  Including   Elevated  Blood  Pressure,  Lipid  Profile,     Blood  Sugar levels  And   Heart  Disease.                 *   Period   Screening  For  Cancers  Involving  Breast,  Colon,    Prostate, lungs  And  Other  Organs  As Applicable,  In consultation   With  Your  Primary Care Providers. * Second  Neurological  Opinion  And  Evaluations  In  [facility]  Setting  If  Patient  Is  Interested. *  If  The  Patient remains  Neurologically  Stable   Return   To  [facility] Neurology Department       IN  3-6   MONTHS  TIME   FOR  FURTHER  FOLLOW UP.                   *  If   There is  Any  Significant  Worsening   Of  Current  Symptoms  And  Or  If patient  Develops       Any additional  New  Neurological  Symptoms  Or  Significant  Concerns   Should  Call  911 or      Go  To  Emergency  Department  For  Further  Immediate  Evaluation. *   The  Neurological   Findings,  Possible  Diagnosis,  Differential diagnoses   And  Options      For    Further   Investigations   And  management   Are  Discussed  Comprehensively. Medications   And  Prescription   Risks  And  Side effects  Are   Also  Discussed. The  Above  Were  Reviewed  With  patient and   HIS  MOTHER    And     questions  Answered  In  Detail. More   Than   50% of face  To face Time   Was  Spent  On  Counseling   And   Coordination  Of  Care       Of   Patient's multiple   Neurological  Problems   And   Comorbid  Medical   Conditions. Electronically signed by Fern Patiño MD.  Sharee Salgado      Board Certified in  Neurology &  In  [employer] [employer] and Neurology ([employer])      DISCLAIMER:   Although every effort was made to ensure the accuracy of this  electronic transcription, some errors in transcription may have occurred. GENERAL PATIENT INSTRUCTIONS:     A Healthy Lifestyle: Care Instructions  Your Care Instructions  A healthy lifestyle can help you feel good, stay at a healthy weight, and have plenty of energy for both work and play. A healthy lifestyle is something you can share with your whole family.   A healthy lifestyle also can lower your risk for serious health problems, such as high blood pressure, heart disease, and diabetes. You can follow a few steps listed below to improve your health and the health of your family. Follow-up care is a key part of your treatment and safety. Be sure to make and go to all appointments, and call your doctor if you are having problems. Its also a good idea to know your test results and keep a list of the medicines you take. How can you care for yourself at home? Do not eat too much sugar, fat, or fast foods. You can still have dessert and treats now and then. The goal is moderation. Start small to improve your eating habits. Pay attention to portion sizes, drink less juice and soda pop, and eat more fruits and vegetables. Eat a healthy amount of food. A 3-ounce serving of meat, for example, is about the size of a deck of cards. Fill the rest of your plate with vegetables and whole grains. Limit the amount of soda and sports drinks you have every day. Drink more water when you are thirsty. Eat at least 5 servings of fruits and vegetables every day. It may seem like a lot, but it is not hard to reach this goal. A serving or helping is 1 piece of fruit, 1 cup of vegetables, or 2 cups of leafy, raw vegetables. Have an apple or some carrot sticks as an afternoon snack instead of a candy bar. Try to have fruits and/or vegetables at every meal.  Make exercise part of your daily routine. You may want to start with simple activities, such as walking, bicycling, or slow swimming. Try to be active 30 to 60 minutes every day. You do not need to do all 30 to 60 minutes all at once. For example, you can exercise 3 times a day for 10 or 20 minutes. Moderate exercise is safe for most people, but it is always a good idea to talk to your doctor before starting an exercise program.  Keep moving. Laneta Satchel the lawn, work in the garden, or [employer].  Take the stairs instead of the elevator at work.  If you smoke, quit. People who smoke have an increased risk for heart attack, stroke, cancer, and other lung illnesses. Quitting is hard, but there are ways to boost your chance of quitting tobacco for good. Use nicotine gum, patches, or lozenges. Ask your doctor about stop-smoking programs and medicines. Keep trying. In addition to reducing your risk of diseases in the future, you will notice some benefits soon after you stop using tobacco. If you have shortness of breath or asthma symptoms, they will likely get better within a few weeks after you quit. Limit how much alcohol you drink. Moderate amounts of alcohol (up to 2 drinks a day for men, 1 drink a day for women) are okay. But drinking too much can lead to liver problems, high blood pressure, and other health problems. Family health  If you have a family, there are many things you can do together to improve your health. Eat meals together as a family as often as possible. Eat healthy foods. This includes fruits, vegetables, lean meats and dairy, and whole grains. Include your family in your fitness plan. Most people think of activities such as jogging or tennis as the way to fitness, but there are many ways you and your family can be more active. Anything that makes you breathe hard and gets your heart pumping is exercise. Here are some tips:  Walk to do errands or to take your child to school or the bus. Go for a family bike ride after dinner instead of watching TV. Where can you learn more? Go to [email or web address]://[email or web address].[email or web address]. org and sign in to your [employer] account. Enter F579 in the [facility] box to learn more about \"A Healthy Lifestyle: Care Instructions. \"     If you do not have an account, please click on the \"Sign Up Now\" link. Current as of: July 26, 2016  Content Version: 11.2  © 1135-0862 [employer]. Care instructions adapted under license by [facility] ([facility]).  If you have questions about a medical condition or this instruction, always ask your healthcare professional. [place] [address] any warranty or liability for your use of this information.

## 2022-09-14 NOTE — PATIENT INSTRUCTIONS
*   SEIZURE  PRECAUTIONS. A)  Avoid  Working  At   [employer] B)  Avoid  Working  With  Heavy machinery. C)  Avoid   Swimming,  Climbing  A  Ladder   Unattended. D)  Avoid   Driving   If  You   Have  A  Seizure. E)  Must   Be  Seizure  Free   For  At   Least   6 months,  Before   You  Can drive. F) Some times  Your  May  Feel  Seizure coming  Before  It  Begins. You  May feel             Strange smell or funny  Feeling  In  Your  Stomach,  Which is  Called   Aura. TIPS  TO  REDUCE/ PREVENT  SEIZURES         1. Take  Your  Anti seizure  Medications   As   Recommended. 2. Get   Enough   Sleep. Sleep  Deprivation   Can  Trigger  A  Seizure. 3. If   You   Have  A fever,  Treat  It  At  Once,  And  Contact   Your  Primary  Care Providers. 4. Avoid   Alcohol. 5. Avoid  Flashing  Lights,  Loud  Noises and  TV  And  Video  Games,           As   These  May  Trigger   Your  Seizures       6. Control  Your  Stress  And   Have  Adequate  Rest.       7.   If  You  Feel  A  Seizure  Coming   On :           A) warn people  Who  Are  With  You           B)  Make  Sure  There  Are  No  Sharp or  Hard  Objects  Around you. C)  Lay down  On  Your  Side  And  Relax. *   ADEQUATE   FLUID  INTAKE   AND  ELECTROLYTE  BALANCE             * KEEP  DAIRY  OF   THE  NEUROLOGICAL  SYMPTOMS          *  TO  MAINTAIN  REGULAR  SLEEP  WAKE  CYCLES.      *   TO  HAVE  ADEQUATE  REST  AND   SLEEP    HOURS.          *    AVOID  USAGE OF   TOBACCO,  EXCESSIVE  ALCOHOL                AND   ILLEGAL   SUBSTANCES,  IF  ANY          *  Maintain   Healthy  Life Style    With   Periodic

## 2022-09-17 LAB — ZONISAMIDE: 8 UG/ML (ref 10–40)

## 2023-03-15 ENCOUNTER — HOSPITAL ENCOUNTER (OUTPATIENT)
Age: 23
Discharge: HOME | End: 2023-03-15
Payer: COMMERCIAL

## 2023-03-15 ENCOUNTER — OFFICE VISIT (OUTPATIENT)
Dept: NEUROLOGY | Age: 23
End: 2023-03-15
Payer: COMMERCIAL

## 2023-03-15 VITALS
BODY MASS INDEX: 40.6 KG/M2 | SYSTOLIC BLOOD PRESSURE: 112 MMHG | OXYGEN SATURATION: 98 % | HEART RATE: 74 BPM | DIASTOLIC BLOOD PRESSURE: 74 MMHG | HEIGHT: 71 IN | WEIGHT: 290 LBS

## 2023-03-15 DIAGNOSIS — G40.909 SEIZURE DISORDER (HCC): ICD-10-CM

## 2023-03-15 DIAGNOSIS — F81.9 LEARNING DIFFICULTY: ICD-10-CM

## 2023-03-15 DIAGNOSIS — G40.909 SEIZURE DISORDER (HCC): Primary | ICD-10-CM

## 2023-03-15 DIAGNOSIS — R56.00 FEBRILE SEIZURES (HCC): ICD-10-CM

## 2023-03-15 DIAGNOSIS — G40.209 PARTIAL SYMPTOMATIC EPILEPSY WITH COMPLEX PARTIAL SEIZURES, NOT INTRACTABLE, WITHOUT STATUS EPILEPTICUS (HCC): ICD-10-CM

## 2023-03-15 DIAGNOSIS — G40.A09 ABSENCE SEIZURE (HCC): ICD-10-CM

## 2023-03-15 DIAGNOSIS — F98.9 BEHAVIORAL AND EMOTIONAL DISORDER WITH ONSET IN CHILDHOOD: ICD-10-CM

## 2023-03-15 LAB
ANION GAP SERPL CALCULATED.3IONS-SCNC: 8 MMOL/L (ref 9–17)
CHLORIDE SERPL-SCNC: 107 MMOL/L (ref 98–107)
CO2 SERPL-SCNC: 27 MMOL/L (ref 20–31)
HCT VFR BLD AUTO: 43.9 % (ref 40.7–50.3)
HGB BLD-MCNC: 14.4 G/DL (ref 13–17)
MCH RBC QN AUTO: 28.9 PG (ref 25.2–33.5)
MCHC RBC AUTO-ENTMCNC: 32.8 G/DL (ref 25.2–33.5)
MCV RBC AUTO: 88 FL (ref 82.6–102.9)
NRBC AUTOMATED: 0 PER 100 WBC
PDW BLD-RTO: 14.1 % (ref 11.8–14.4)
PLATELET # BLD AUTO: 262 K/UL (ref 138–453)
PMV BLD AUTO: 10.1 FL (ref 8.1–13.5)
POTASSIUM SERPL-SCNC: 4.8 MMOL/L (ref 3.7–5.3)
RBC # BLD: 4.99 M/UL (ref 4.21–5.77)
SODIUM SERPL-SCNC: 142 MMOL/L (ref 135–144)
VALPROATE SERPL-MCNC: 42 UG/ML (ref 50–125)
WBC # BLD AUTO: 8.3 K/UL (ref 4.5–13.5)

## 2023-03-15 PROCEDURE — G8484 FLU IMMUNIZE NO ADMIN: HCPCS | Performed by: PSYCHIATRY & NEUROLOGY

## 2023-03-15 PROCEDURE — 99214 OFFICE O/P EST MOD 30 MIN: CPT | Performed by: PSYCHIATRY & NEUROLOGY

## 2023-03-15 PROCEDURE — 36415 COLL VENOUS BLD VENIPUNCTURE: CPT

## 2023-03-15 PROCEDURE — 80051 ELECTROLYTE PANEL: CPT

## 2023-03-15 PROCEDURE — 1036F TOBACCO NON-USER: CPT | Performed by: PSYCHIATRY & NEUROLOGY

## 2023-03-15 PROCEDURE — 80203 DRUG SCREEN QUANT ZONISAMIDE: CPT

## 2023-03-15 PROCEDURE — 80164 ASSAY DIPROPYLACETIC ACD TOT: CPT

## 2023-03-15 PROCEDURE — G8427 DOCREV CUR MEDS BY ELIG CLIN: HCPCS | Performed by: PSYCHIATRY & NEUROLOGY

## 2023-03-15 PROCEDURE — G8417 CALC BMI ABV UP PARAM F/U: HCPCS | Performed by: PSYCHIATRY & NEUROLOGY

## 2023-03-15 PROCEDURE — 85027 COMPLETE CBC AUTOMATED: CPT

## 2023-03-15 RX ORDER — DIVALPROEX SODIUM 500 MG/1
TABLET, DELAYED RELEASE ORAL
Qty: 60 TABLET | Refills: 5 | Status: SHIPPED | OUTPATIENT
Start: 2023-03-15

## 2023-03-15 RX ORDER — ZONISAMIDE 100 MG/1
100 CAPSULE ORAL 2 TIMES DAILY
Qty: 60 CAPSULE | Refills: 5 | Status: SHIPPED | OUTPATIENT
Start: 2023-03-15

## 2023-03-15 ASSESSMENT — ENCOUNTER SYMPTOMS
GASTROINTESTINAL NEGATIVE: 1
ABDOMINAL PAIN: 0
VISUAL CHANGE: 0
RESPIRATORY NEGATIVE: 1
SORE THROAT: 0
NAUSEA: 0
VOMITING: 0
SWOLLEN GLANDS: 0
EYES NEGATIVE: 1
CHANGE IN BOWEL HABIT: 0
COUGH: 0
ALLERGIC/IMMUNOLOGIC NEGATIVE: 1
DIARRHEA: 0

## 2023-03-15 NOTE — PROGRESS NOTES
Subjective:        Patient ID: Randy Choudhury is a 25 y.o. RIGHT   HANDED   . Seizures  This is a chronic problem. Episode onset: IN  CHILD [place]. The problem has been resolved. Pertinent negatives include no abdominal pain, anorexia, arthralgias, change in bowel habit, chest pain, chills, congestion, coughing, diaphoresis, fatigue, fever, headaches, joint swelling, myalgias, nausea, neck pain, numbness, rash, sore throat, swollen glands, urinary symptoms, vertigo, visual change, vomiting or weakness. Nothing aggravates the symptoms. Treatments tried: ANTI  EPILEPTIC MEDICATIONS. The treatment provided significant relief. History obtained from  The patient and  HIS     GRAND    MOTHER  (  GUARDIAN )        and other  available medical records were  Also  reviewed.               1)      H/O  KNOWN  CHILDHOOD ONSET  EPILEPSY                 FIRST    SEIZURE    REPORTED   TO   BE  AT   THE  AGE                        OF   2 1/2   YEARS  OLD    AS  PER  HIS  MOTHER                               -   STABLE          2)       PREVIOUS    H/O    FEBRILE SEIZURES                       -  NO  RECURRENCE                3)        PATIENT'S   PREVIOUS    SEIZURE  TYPES  INCLUDE :                  GTC  SEIZURES,  STARING  EPISODES,                 ABSENCE  AND  PARTIAL  COMPLEX  SEIZURES                        -     WELL  CONTROLLED                4)       FAMILY   H/O  SEIZURE  DISORDER/  EPILEPSY          5)       PREVIOUS     H/O  ADHD                         -   STABLE                PATIENT  TO  FOLLOW  WITH  MENTAL  HEALTH  PROFESSIONALS              6)        KNOWN     H/O   LEARNING  DIFFICULTY                      -  IMPROVED                    PATIENT   COMPLETED       12  TH  GRADE                         WITH       VOCATIONAL  EDUCATION            7)       PREVIOUS  H/O  BEHAVIORAL  PROBLEMS                    -  WELL  CONTROLLED          8)         LAST  SEIZURE  ACTIVITY   REPORTED  TO  BE  IN December 2014            9)       HAS   BEEN      ON  DEPAKOTE  AND  ZONEGRAN                TOLERATING  THE SAME  WITHOUT  ANY  SIDE  EFFECTS            10)       EEG   IN  December 2017    SHOWED                   NO  EPILEPTIFORM  FEATURES.              11)         PATIENT  NEUROLOGICALLY   STABLE                      WITHOUT  ANY  RECURRENCE  OF SEIZURE  ACTIVITY.                       NO  MEMORY PROBLEMS.   NO  CONFUSIONAL EPISODES.                         NO   STARING  EPISODES            12)       PATIENT     GOT     DRIVERS  LICENSE   PERMIT  IN    7/30/2020                        PATIENT      SHOULD   CONTINUE  :                      A)    ANTI  EPILEPTIC  MEDICATIONS  PROPERLY                       B)    FOLLOW  SEIZURE  PRECAUTIONS                         -   DISCUSSED   WITH   PATIENT  AND  HIS  MOTHER                                       13)      PATIENT   AND  HIS  MOTHER  REQUESTED      COMPLETION  OF                         [facility]     FORM     FOR   HIS  CONTINUED   DRIVING  PRIVILEGES                                 -     COMPLETED    ON    10/07/2021            14)      ANTI  EPILEPTIC  MEDICATION  LEVELS    IN   AUG.  2021                      MARCH ,    SEPT. 2022      ARE    WITH  IN  NORMAL  LIMITS.                  15)             PATIENT  AND  HIS  MOTHER   DENIED  ANY                     SEIZURE   RECURRENCE      AND   ANY    NEW                         NEUROLOGICAL    CONCERNS.                                               The  Duration,  Quality,  Severity,  Location,  Timing,  Context,  Modifying  Factors   Of   The   Chief   Complaint       And  Present  Illness   Was   Reviewed   In   Chronological   [place].                                       Patient   Indicates   The  Presence   And  The  Absence  Of  The  Following  Associated  And         Additional  Neurological    Symptoms:                                Balance  And coordination problems  absent           Gait problems      absent            Headaches      absent              Migraines           absent           Memory problems        Absent             Confusion        absent            Paresthesia numbness          absent           Seizures  And  Starring  Episodes           present           Syncope,  Near  syncopal episodes         absent           Speech problems           absent             Swallowing  Problems      absent            Dizziness,  Light headedness           absent                        Vertigo        absent             Generalized   Weakness    absent              focal  Weakness     absent             Tremors         absent              Sleep  Problems     absent             History  Of   Recent   Head  Injury     absent             History  Of   Recent  TIA     absent             History  Of   Recent    Stroke     absent             Neck  Pain and  Neck muscle  Spasms  Absent               Radiating  down   And   Weakness           absent            Lower back   Pain  And     Spasms  Absent              Radiating    Down   And   Weakness          absent                H/O   FALLS        absent               History  Of   Visual  Symptoms    Absent                  Associated   Diplopia       absent                                    Also   Additional   Symptoms   Present    As  Documented    In   The detailed      Review  Of  Systems   And    Please   Refer   To    Them for   Additional  Information. Any components  That are either  Unobtainable  Or  Limited  In   HPI, ROS  And/or PFSH   Are       Due   To   Patient's  Medical  Problems,  Clinical  Condition and/or lack of other  Alternate resources.             RECORDS   REVIEWED:    historical medical records, lab reports, office notes and radiology reports           INFORMATION   REVIEWED:     MEDICAL   HISTORY,     SURGICAL   HISTORY,   MEDICATIONS   LIST,   ALLERGIES AND  DRUG  INTOLERANCES,     FAMILY   HISTORY,  SOCIAL  HISTORY, PROBLEM  LIST   FOR  PATIENT  CARE   COORDINATION             Past Medical History:   Diagnosis Date    Abnormal EEG     Absence seizure ([facility] [address]     Attention deficit hyperactivity disorder     Behavioral problems     Complex partial epileptic seizure ([facility] [address]     Epilepsy ([facility] [address]     Febrile seizures ([facility] [address] last seizure in July 2010    Learning difficulty     Tonic clonic seizures ([facility] [address]                   Past Surgical History:   Procedure Laterality Date    CYST REMOVAL  04/2021    Tailbone    TONSILLECTOMY AND ADENOIDECTOMY  2003                 Current Outpatient Medications   Medication Sig Dispense Refill    divalproex (DEPAKOTE) 500 MG DR tablet ONE TABLET     TWICE  DAILY 60 tablet 5    zonisamide (ZONEGRAN) 100 MG capsule Take 1 capsule by mouth 2 times daily 60 capsule 5    vitamin D (CHOLECALCIFEROL) 25 MCG (1000 UT) TABS tablet Take 1,000 Units by mouth daily      Multiple Vitamins-Minerals (MULTIVITAL) CHEW Take  by mouth daily. loratadine (CLARITIN) 10 MG tablet Take 10 mg by mouth as needed. No current facility-administered medications for this visit.            No Known Allergies            Family History   Problem Relation Age of Onset    Depression Mother     Asthma Mother     Neuropathy Mother     Mental Illness Mother     Seizures Neg Hx              Social History     Socioeconomic History    Marital status: Single     Spouse name: Not on file    Number of children: Not on file    Years of education: Not on file    Highest education level: Not on file   Occupational History    Not on file   Tobacco Use    Smoking status: Never    Smokeless tobacco: Never   Vaping Use    Vaping Use: Never used   Substance and Sexual Activity    Alcohol use: No     Alcohol/week: 0.0 standard drinks    Drug use: Never    Sexual activity: Not on file   Other Topics Concern    Not on file   Social History Narrative    Not on file     Social Determinants of Health     Financial Resource Strain: Not on file   Food Insecurity: Not on file   Transportation Needs: Not on file   Physical Activity: Not on file   Stress: Not on file   Social Connections: Not on file   Intimate Partner Violence: Not on file   Housing Stability: Not on file       Vitals:    03/15/23 0919   BP: 112/74   Pulse: 74   SpO2: 98%         Wt Readings from Last 3 Encounters:   03/15/23 290 lb (131.5 kg)   09/14/22 287 lb (130.2 kg)   03/14/22 266 lb (120.7 kg)         BP Readings from Last 3 Encounters:   03/15/23 112/74   09/14/22 110/80   03/14/22 118/80       Hematology and Coagulation  Lab Results   Component Value Date/Time    WBC 9.1 09/14/2022 09:59 AM    RBC 5.17 09/14/2022 09:59 AM    HGB 14.7 09/14/2022 09:59 AM    HCT 45.0 09/14/2022 09:59 AM    MCV 87.0 09/14/2022 09:59 AM    MCH 28.4 09/14/2022 09:59 AM    MCHC 32.7 09/14/2022 09:59 AM    RDW 14.0 09/14/2022 09:59 AM     09/14/2022 09:59 AM    MPV 10.1 09/14/2022 09:59 AM       Chemistries  Lab Results   Component Value Date/Time     09/14/2022 09:59 AM    K 5.3 09/14/2022 09:59 AM     09/14/2022 09:59 AM    CO2 28 09/14/2022 09:59 AM    BUN 16 10/16/2018 04:35 PM    CREATININE 1.09 10/16/2018 04:35 PM    AST 22 03/05/2019 04:06 PM    ALT 31 03/05/2019 04:06 PM     Lab Results   Component Value Date/Time    ALT 31 03/05/2019 04:06 PM    AST 22 03/05/2019 04:06 PM     Lab Results   Component Value Date/Time    AST 22 03/05/2019 04:06 PM    ALT 31 03/05/2019 04:06 PM     Lab Results   Component Value Date/Time    [id number] 1194 03/14/2022 03:19 PM         Drug Levels  Lab Results   Component Value Date/Time    VALPROATE 37 09/14/2022 09:59 AM    VALPROATE 56 03/14/2022 03:19 PM           Review of Systems   Constitutional:  Negative for chills, diaphoresis, fatigue and fever. HENT: Negative. Negative for congestion and sore throat. Eyes: Negative. Respiratory: Negative. Negative for cough. Cardiovascular: Negative. Negative for chest pain and palpitations. Gastrointestinal: Negative. Negative for abdominal pain, anorexia, change in bowel habit, diarrhea, nausea and vomiting. Endocrine: Negative. Genitourinary: Negative. Musculoskeletal: Negative. Negative for arthralgias, joint swelling, myalgias and neck pain. Skin: Negative. Negative for rash. Allergic/Immunologic: Negative. Neurological:  Positive for seizures. Negative for dizziness, vertigo, tremors, syncope, facial asymmetry, speech difficulty, weakness, light-headedness, numbness and headaches. Hematological: Negative. Psychiatric/Behavioral:  Positive for behavioral problems and decreased concentration. Negative for agitation, confusion, dysphoric mood, hallucinations, self-injury and suicidal ideas. The patient is not nervous/anxious. Objective:   Physical Exam  Constitutional:       Appearance: He is well-developed. HENT:      Head: Normocephalic and atraumatic. No raccoon eyes or Calixto's sign. Right Ear: External ear normal.      Left Ear: External ear normal.      Nose: Nose normal.   Eyes:      Conjunctiva/sclera: Conjunctivae normal.      Pupils: Pupils are equal, round, and reactive to light. Neck:      Thyroid: No thyroid mass or thyromegaly. Vascular: No carotid bruit. Trachea: No tracheal deviation. Meningeal: Brudzinski's sign and Kernig's sign absent. Cardiovascular:      Rate and Rhythm: Normal rate and regular rhythm. Pulmonary:      Effort: Pulmonary effort is normal.   Musculoskeletal:         General: No tenderness. Cervical back: Normal range of motion and neck supple. No rigidity. No muscular tenderness. Normal range of motion. Skin:     General: Skin is warm. Coloration: Skin is not pale. Findings: No erythema or rash. Nails: There is no clubbing. Psychiatric:         Attention and Perception: He is attentive. Mood and Affect: Mood is not anxious or depressed.  Affect is not labile, blunt or inappropriate.         Speech: He is communicative. Speech is not rapid and pressured, delayed, slurred or tangential.         Behavior: Behavior is not agitated, slowed, aggressive, withdrawn, hyperactive or combative. Behavior is cooperative.         Thought Content: Thought content is not paranoid or delusional. Thought content does not include homicidal or suicidal ideation. Thought content does not include homicidal or suicidal plan.         Cognition and Memory: Memory is not impaired. He does not exhibit impaired recent memory or impaired remote memory.         Judgment: Judgment is not impulsive or inappropriate.       NEUROLOGICAL EXAMINATION :    A) MENTAL STATUS:                   Alert and  oriented  To time, place  And  Person.                  No Aphasia.  No  Dysarthria.                    Able   To  Follow   SIMPLE      commands without   Any  Difficulty.                 No right  To left confusion.                  Normal  Speech  And language function.                   Insight and  Judgment ,Fund  Of  Knowledge   within normal limits                Recent  And  Remote memory  within normal limits                Attention &Concentration are within normal limits                                                     B) CRANIAL NERVES :             2 CN : Visual  Acuity  And  Visual fields  within normal limits                        Fundi  Could  Not  Be  Could  Not  Be  Evaluated.           3,4,6 CN : Both  Pupils are   Reactive and  Equal.                            Extraocular   Movements  Are  Intact.                             No  Nystagmus.  No  DELMY.  No  Afferent  Pupillary  Defect noted.          5 CN :  Normal  Facial sensations and Corneal  Reflexes           7 CN :  Normal  Facial  Symmetry  And  Strength.  No facial  Weakness.           8 CN :  Hearing  Appears within normal limits          9, 10 CN: Normal spontaneous, reflex palate movements         11 CN:   Normal  Shoulder shrug and   Strength         12 CN :   Normal  Tongue movements and  Tongue  In midline                        No tongue   Fasciculations or atrophy           C) MOTOR  EXAM:                 Strength  In upper  And  Lower extremities   within normal limits               No  Drift. No  Atrophy               Rapid alternating  And  repetitions  Movements  within normal limits               Muscle  Tone  In upper  And  Lower  Extremities  Normal                No rigidity. No  Spasticity. Bradykinesia   Absent                 No  Asterixis. Sustention  Tremor , Resting  Tremor   absent                    No other  Abnormal  Movements noted           D) SENSORY :               light touch, pinprick, position  And  Vibration  within normal limits        E) REFLEXES:                   Deep  Tendon  Reflexes normal                    No pathological  Reflexes  Bilaterally. F) COORDINATION  AND  GAIT :                                Station and  Gait  normal                                        Romberg's test negative                          Ataxia negative      Assessment:           Patient Active Problem List   Diagnosis    Attention deficit hyperactivity disorder    Febrile seizures (HCC)    Abnormal EEG    Tonic clonic seizures (HCC)    Behavioral and emotional disorder with onset in childhood    Epilepsy ([address]    Learning difficulty    Complex partial epileptic seizure ([address]    Absence seizure ([address]    Seizures ([address]           Plan:           VISITING DIAGNOSIS:          ICD-10-CM    1. Seizure disorder (HCC)  G40.909 divalproex (DEPAKOTE) 500 MG DR tablet     zonisamide (ZONEGRAN) 100 MG capsule     CBC     Electrolyte Panel     Valproic Acid Level, Total     Zonisamide Level      2. Learning difficulty  F81.9       3. Partial symptomatic epilepsy with complex partial seizures, not intractable, without status epilepticus ([address]  G40.209       4.  Absence seizure ([facility] [address]  G40. A09       5. Febrile seizures ([facility] [address]  R56.00       6. Behavioral and emotional disorder with onset in childhood  F98.9                  CONCERNS   &   INCREASED   RISK   FOR           *  SEIZURE  ACTIVITY,  EPILEPSY           *        VARIOUS  RISK   FACTORS   WERE  REVIEWED   AND   DISCUSSED. *  PATIENT   HAS  MULTIPLE   MEDICAL, MENTAL HEALTH  &    NEUROLOGICAL   PROBLEMS . PATIENT'S   MANAGEMENT  IS  CHALLENGING. PLAN:       Danica Sousa  Of  The  Diagnoses,  The  Management & Treatment  Options           AND    Care  plan  Were        Reviewed and   Discussed   With  patient and   HIS  MOTHER    . * Goals  And  Expectations  Of  The  Therapy  Discussed   And  Reviewed. *   Benefits   And   Side  Effect  Profile  Of  Medication/s   Were   Discussed             * Need   For  Further   Follow up For  The  Various  Problems  Were discussed. * Results  Of  The  Previous  Diagnostic tests were reviewed and questions answered. patient and father  understand the same. Medical  Decision  Making  Was  Made  Based on the   Complexity  Of  Above  Mentioned  Diagnoses,        Data reviewed   & diagnostic  Tests  Reviewed,  Risk  Of  Significant   Co morbidities and complicating   Factors. Medical  Decision  Was   High  Complexity  Due   To  The  Patient's  Multiple  Symptoms,      Complex  Treatment  Regimen,  Multiple medications and   Risk  Of   Side  Effects,  Difficulty  In  Medication  Management      And  Diagnostic  Challenges   In  View  Of  The  Associated   Co  Morbid  Conditions   And  Problems. *    SUPERVISED   CARE      *   BE  CAREFUL  WITH  ACTIVITIES             *   ADEQUATE   FLUID  INTAKE   AND  ELECTROLYTE  BALANCE             * KEEP  DAIRY  OF   THE  NEUROLOGICAL  SYMPTOMS        RECORDING THE    DURATION  AND  FREQUENCY.           *  AVOID    CONDITIONS  AND  FACTORS   THAT  MAKE NEUROLOGICAL  SYMPTOMS  WORSE. *   SEIZURE  PRECAUTIONS.  -     DISCUSSED                    A)  Avoid  Working  At   [employer] B)  Avoid  Working  With  Heavy machinery. C)  Avoid   Swimming,  Climbing  A  Ladder   Unattended. D)  Avoid   Driving   If  You   Have  A  Seizure. E)  Must   Be  Seizure  Free   For  At   Least   6 months,  Before   You  Can drive. F) Some times  Your  May  Feel  Seizure coming  Before  It  Begins. You  May feel               Strange smell or funny  Feeling  In  Your  Stomach,  Which is  Called   Aura. TIPS  TO  REDUCE/ PREVENT  SEIZURES         1. Take  Your  Anti seizure  Medications   As   Recommended. 2. Get   Enough   Sleep. Sleep  Deprivation   Can  Trigger  A  Seizure. 3. If   You   Have  A fever,  Treat  It  At  Once,  And  Contact   Your  Primary  Care Providers. 4. Avoid   Alcohol. 5. Avoid  Flashing  Lights,  Loud  Noises and  TV  And  Video  Games,             As   These  May  Trigger   Your  Seizures       6. Control  Your  Stress  And   Have  Adequate  Rest.       7.   If  You  Feel  A  Seizure  Coming   On :             A) warn people  Who  Are  With  You           B)  Make  Sure  There  Are  No  Sharp or  Hard  Objects  Around you. C)  Lay down  On  Your  Side  And  Relax. *  TO  MAINTAIN  REGULAR  SLEEP  WAKE  CYCLES. *   TO  HAVE  ADEQUATE  REST  AND   SLEEP    HOURS. *      WEIGHT   LOSS.              *    AVOID  ANY USAGE OF                   TOBACCO,  EXCESSIVE  ALCOHOL  AND   ILLEGAL   SUBSTANCES          *  CONTINUE MEDICATIONS PRESCRIBED      AS    RECOMMENDED       *   Compliance   With Medications   And  Instructions          * CURRENTLY  TOLERATING  THE  PRESCRIBED   MEDICATIONS. WITHOUT  ANY  SIGNIFICANT  SIDE  EFFECTS   &  GETTING BENEFIT. *  May   Use  Pill  Box,    If  Needed        *  MEDICATIONS TO AVOID  :    WELLBUTRIN,  ULTRAM         *    Prophylactic  Use   Of     Vitamin   B   Complex,  Folic  Acid,    Vitamin  B12        Multivitamin   Tablet  Daily    Supplementations   Over  The  Counter  Discussed           *  PATIENT  IS  ALSO   COUNSELED   TO  KEEP    ACTIVITIES         A)   SIMPLE      B)  ORGANIZED      C)  WRITE   DOWN                               *             PATIENT  AND  HIS  MOTHER   DENIED  ANY                     SEIZURE   RECURRENCE      AND   ANY    NEW                         NEUROLOGICAL    CONCERNS. Orders Placed This Encounter   Procedures    CBC    Electrolyte Panel    Valproic Acid Level, Total    Zonisamide Level       Orders Placed This Encounter   Medications    divalproex (DEPAKOTE) 500 MG DR tablet     Sig: ONE TABLET     TWICE  DAILY     Dispense:  60 tablet     Refill:  5    zonisamide (ZONEGRAN) 100 MG capsule     Sig: Take 1 capsule by mouth 2 times daily     Dispense:  60 capsule     Refill:  5                     *PATIENT   TO  FOLLOW  UP  WITH   PRIMARY  CARE   AND   OTHER  CONSULTANTS  AS  BEFORE.           *TO  FOLLOW  WITH   MENTAL  HEALTH  PROFESSIONALS ,  INCLUDING            PSYCHOLOGICAL  COUNSELING   AND  PSYCHIATRIC  EVALUTIONS            *  Maintain   Healthy  Life Style    With   Periodic  Monitoring  Of      Any  Medical  Conditions  Including   Elevated  Blood  Pressure,  Lipid  Profile,     Blood  Sugar levels  And   Heart  Disease. *   Period   Screening  For  Cancers  Involving  Breast,  Colon,    Prostate, lungs  And  Other  Organs  As  Applicable,  In consultation   With  Your  Primary Care Providers.                 * Second  Neurological  Opinion  And  Evaluations  In  A  Teaching Hospital  Setting  If  Patient  Is  Interested. *  If  The  Patient remains  Neurologically  Stable   Return   To  [facility] Neurology Department       IN  3-6   MONTHS  TIME   FOR  FURTHER  FOLLOW UP.                   *  If   There is  Any  Significant  Worsening   Of  Current  Symptoms  And  Or  If patient  Develops       Any additional  New  Neurological  Symptoms  Or  Significant  Concerns   Should  Call  911 or      Go  To  Emergency  Department  For  Further  Immediate  Evaluation. *   The  Neurological   Findings,  Possible  Diagnosis,  Differential diagnoses   And  Options      For    Further   Investigations   And  management   Are  Discussed  Comprehensively. Medications   And  Prescription   Risks  And  Side effects  Are   Also  Discussed. The  Above  Were  Reviewed  With  patient and   HIS  MOTHER    And     questions  Answered  In  Detail. More   Than   50% of face  To face Time   Was  Spent  On  Counseling   And   Coordination  Of  Care       Of   Patient's multiple   Neurological  Problems   And   Comorbid  Medical   Conditions. Electronically signed by Lucila Pereira MD.  [facility]      Board Certified in  Neurology &  In  [employer] [employer] and Neurology ([employer])      DISCLAIMER:   Although every effort was made to ensure the accuracy of this  electronic transcription, some errors in transcription may have occurred. GENERAL PATIENT INSTRUCTIONS:     A Healthy Lifestyle: Care Instructions  Your Care Instructions  A healthy lifestyle can help you feel good, stay at a healthy weight, and have plenty of energy for both work and play. A healthy lifestyle is something you can share with your whole family. A healthy lifestyle also can lower your risk for serious health problems, such as high blood pressure, heart disease, and diabetes.   You can follow a few steps listed below to improve your health and the health of your family. Follow-up care is a key part of your treatment and safety. Be sure to make and go to all appointments, and call your doctor if you are having problems. Its also a good idea to know your test results and keep a list of the medicines you take. How can you care for yourself at home? Do not eat too much sugar, fat, or fast foods. You can still have dessert and treats now and then. The goal is moderation. Start small to improve your eating habits. Pay attention to portion sizes, drink less juice and soda pop, and eat more fruits and vegetables. Eat a healthy amount of food. A 3-ounce serving of meat, for example, is about the size of a deck of cards. Fill the rest of your plate with vegetables and whole grains. Limit the amount of soda and sports drinks you have every day. Drink more water when you are thirsty. Eat at least 5 servings of fruits and vegetables every day. It may seem like a lot, but it is not hard to reach this goal. A serving or helping is 1 piece of fruit, 1 cup of vegetables, or 2 cups of leafy, raw vegetables. Have an apple or some carrot sticks as an afternoon snack instead of a candy bar. Try to have fruits and/or vegetables at every meal.  Make exercise part of your daily routine. You may want to start with simple activities, such as walking, bicycling, or slow swimming. Try to be active 30 to 60 minutes every day. You do not need to do all 30 to 60 minutes all at once. For example, you can exercise 3 times a day for 10 or 20 minutes. Moderate exercise is safe for most people, but it is always a good idea to talk to your doctor before starting an exercise program.  Keep moving. [place] the lawn, work in the garden, or [employer]. Take the stairs instead of the elevator at work. If you smoke, quit. People who smoke have an increased risk for heart attack, stroke, cancer, and other lung illnesses.  Quitting is hard, but there are ways to boost your chance of quitting tobacco for good. Use nicotine gum, patches, or lozenges. Ask your doctor about stop-smoking programs and medicines. Keep trying. In addition to reducing your risk of diseases in the future, you will notice some benefits soon after you stop using tobacco. If you have shortness of breath or asthma symptoms, they will likely get better within a few weeks after you quit. Limit how much alcohol you drink. Moderate amounts of alcohol (up to 2 drinks a day for men, 1 drink a day for women) are okay. But drinking too much can lead to liver problems, high blood pressure, and other health problems. Family health  If you have a family, there are many things you can do together to improve your health. Eat meals together as a family as often as possible. Eat healthy foods. This includes fruits, vegetables, lean meats and dairy, and whole grains. Include your family in your fitness plan. Most people think of activities such as jogging or tennis as the way to fitness, but there are many ways you and your family can be more active. Anything that makes you breathe hard and gets your heart pumping is exercise. Here are some tips:  Walk to do errands or to take your child to school or the bus. Go for a family bike ride after dinner instead of watching TV. Where can you learn more? Go to https://TIDAL PETROLEUMpeMe![employer].Kwelia. org and sign in to your [employer] account. Enter PRERNA in the [facility] box to learn more about \"A Healthy Lifestyle: Care Instructions. \"     If you do not have an account, please click on the \"Sign Up Now\" link. Current as of: July 26, 2016  Content Version: 11.2  © 5151-3720 [employer]. Care instructions adapted under license by [facility] ([facility]).  If you have questions about a medical condition or this instruction, always ask your healthcare professional. [address] any warranty or liability for your use of this information.

## 2023-03-18 LAB — ZONISAMIDE: 9 UG/ML (ref 10–40)

## 2023-09-20 ENCOUNTER — HOSPITAL ENCOUNTER (OUTPATIENT)
Age: 23
Discharge: HOME | End: 2023-09-20
Payer: COMMERCIAL

## 2023-09-20 ENCOUNTER — OFFICE VISIT (OUTPATIENT)
Dept: NEUROLOGY | Age: 23
End: 2023-09-20
Payer: COMMERCIAL

## 2023-09-20 VITALS
BODY MASS INDEX: 38.92 KG/M2 | OXYGEN SATURATION: 97 % | HEART RATE: 81 BPM | WEIGHT: 278 LBS | HEIGHT: 71 IN | SYSTOLIC BLOOD PRESSURE: 122 MMHG | TEMPERATURE: 98.2 F | DIASTOLIC BLOOD PRESSURE: 78 MMHG

## 2023-09-20 DIAGNOSIS — R94.01 ABNORMAL EEG: ICD-10-CM

## 2023-09-20 DIAGNOSIS — G40.A09 ABSENCE SEIZURE (HCC): ICD-10-CM

## 2023-09-20 DIAGNOSIS — G40.409 OTHER GENERALIZED EPILEPSY, NOT INTRACTABLE, WITHOUT STATUS EPILEPTICUS (HCC): ICD-10-CM

## 2023-09-20 DIAGNOSIS — G40.209 PARTIAL SYMPTOMATIC EPILEPSY WITH COMPLEX PARTIAL SEIZURES, NOT INTRACTABLE, WITHOUT STATUS EPILEPTICUS (HCC): ICD-10-CM

## 2023-09-20 DIAGNOSIS — F90.0 ATTENTION DEFICIT HYPERACTIVITY DISORDER (ADHD), PREDOMINANTLY INATTENTIVE TYPE: ICD-10-CM

## 2023-09-20 DIAGNOSIS — G40.909 SEIZURE DISORDER (HCC): Primary | ICD-10-CM

## 2023-09-20 DIAGNOSIS — G40.909 SEIZURE DISORDER (HCC): ICD-10-CM

## 2023-09-20 DIAGNOSIS — G40.409 TONIC CLONIC SEIZURES (HCC): ICD-10-CM

## 2023-09-20 DIAGNOSIS — R56.00 FEBRILE SEIZURES (HCC): ICD-10-CM

## 2023-09-20 DIAGNOSIS — F81.9 LEARNING DIFFICULTY: ICD-10-CM

## 2023-09-20 LAB
ANION GAP SERPL CALCULATED.3IONS-SCNC: 10 MMOL/L (ref 9–17)
CHLORIDE SERPL-SCNC: 107 MMOL/L (ref 98–107)
CO2 SERPL-SCNC: 27 MMOL/L (ref 20–31)
ERYTHROCYTE [DISTWIDTH] IN BLOOD BY AUTOMATED COUNT: 14 % (ref 11.8–14.4)
HCT VFR BLD AUTO: 46.1 % (ref 40.7–50.3)
HGB BLD-MCNC: 15.2 G/DL (ref 13–17)
MCH RBC QN AUTO: 29.1 PG (ref 25.2–33.5)
MCHC RBC AUTO-ENTMCNC: 33 G/DL (ref 25.2–33.5)
MCV RBC AUTO: 88.3 FL (ref 82.6–102.9)
NRBC BLD-RTO: 0 PER 100 WBC
PLATELET # BLD AUTO: 275 K/UL (ref 138–453)
PMV BLD AUTO: 10 FL (ref 8.1–13.5)
POTASSIUM SERPL-SCNC: 4.6 MMOL/L (ref 3.7–5.3)
RBC # BLD AUTO: 5.22 M/UL (ref 4.21–5.77)
SODIUM SERPL-SCNC: 144 MMOL/L (ref 135–144)
VALPROATE SERPL-MCNC: 44 UG/ML (ref 50–125)
WBC OTHER # BLD: 8.5 K/UL (ref 4.5–13.5)

## 2023-09-20 PROCEDURE — 1036F TOBACCO NON-USER: CPT | Performed by: PSYCHIATRY & NEUROLOGY

## 2023-09-20 PROCEDURE — 80051 ELECTROLYTE PANEL: CPT

## 2023-09-20 PROCEDURE — G8417 CALC BMI ABV UP PARAM F/U: HCPCS | Performed by: PSYCHIATRY & NEUROLOGY

## 2023-09-20 PROCEDURE — 85027 COMPLETE CBC AUTOMATED: CPT

## 2023-09-20 PROCEDURE — 80164 ASSAY DIPROPYLACETIC ACD TOT: CPT

## 2023-09-20 PROCEDURE — G8427 DOCREV CUR MEDS BY ELIG CLIN: HCPCS | Performed by: PSYCHIATRY & NEUROLOGY

## 2023-09-20 PROCEDURE — 99214 OFFICE O/P EST MOD 30 MIN: CPT | Performed by: PSYCHIATRY & NEUROLOGY

## 2023-09-20 PROCEDURE — 36415 COLL VENOUS BLD VENIPUNCTURE: CPT

## 2023-09-20 PROCEDURE — 80203 DRUG SCREEN QUANT ZONISAMIDE: CPT

## 2023-09-20 RX ORDER — ZONISAMIDE 100 MG/1
100 CAPSULE ORAL 2 TIMES DAILY
Qty: 60 CAPSULE | Refills: 5 | Status: SHIPPED | OUTPATIENT
Start: 2023-09-20

## 2023-09-20 RX ORDER — DIVALPROEX SODIUM 500 MG/1
TABLET, DELAYED RELEASE ORAL
Qty: 60 TABLET | Refills: 5 | Status: SHIPPED | OUTPATIENT
Start: 2023-09-20

## 2023-09-20 ASSESSMENT — ENCOUNTER SYMPTOMS
CHANGE IN BOWEL HABIT: 0
RESPIRATORY NEGATIVE: 1
ABDOMINAL PAIN: 0
SWOLLEN GLANDS: 0
NAUSEA: 0
EYES NEGATIVE: 1
VOMITING: 0
DIARRHEA: 0
VISUAL CHANGE: 0
ALLERGIC/IMMUNOLOGIC NEGATIVE: 1
SORE THROAT: 0
COUGH: 0
GASTROINTESTINAL NEGATIVE: 1

## 2023-09-20 NOTE — PROGRESS NOTES
Subjective:        Patient ID: Diane Matias is a 25 y.o. RIGHT   HANDED   . Seizures  This is a chronic problem. Episode onset: IN  CHILD [place]. The problem has been resolved. Pertinent negatives include no abdominal pain, anorexia, arthralgias, change in bowel habit, chest pain, chills, congestion, coughing, diaphoresis, fatigue, fever, headaches, joint swelling, myalgias, nausea, neck pain, numbness, rash, sore throat, swollen glands, urinary symptoms, vertigo, visual change, vomiting or weakness. Nothing aggravates the symptoms. Treatments tried: ANTI  EPILEPTIC MEDICATIONS. The treatment provided significant relief. History obtained from  The patient and  HIS     GRAND    MOTHER  (  GUARDIAN )        and other  available medical records were  Also  reviewed.               1)      H/O  KNOWN  CHILDHOOD ONSET  EPILEPSY                 FIRST    SEIZURE    REPORTED   TO   BE  AT   THE  AGE                        OF   2 1/2   YEARS  OLD    AS  PER  HIS  MOTHER                               -   STABLE          2)       PREVIOUS    H/O    FEBRILE SEIZURES                       -  NO  RECURRENCE                3)        PATIENT'S   PREVIOUS    SEIZURE  TYPES  INCLUDE :                  GTC  SEIZURES,  STARING  EPISODES,                 ABSENCE  AND  PARTIAL  COMPLEX  SEIZURES                        -     WELL  CONTROLLED                4)       FAMILY   H/O  SEIZURE  DISORDER/  EPILEPSY          5)       PREVIOUS     H/O  ADHD                         -   STABLE                PATIENT  TO  FOLLOW  WITH  MENTAL  HEALTH  PROFESSIONALS              6)        KNOWN     H/O   LEARNING  DIFFICULTY                      -  IMPROVED                    PATIENT   COMPLETED       12  TH  GRADE                         WITH       VOCATIONAL  EDUCATION            7)       PREVIOUS  H/O  BEHAVIORAL  PROBLEMS                    -  WELL  CONTROLLED          8)         LAST  SEIZURE  ACTIVITY   REPORTED  TO  BE  IN

## 2023-09-22 LAB — ZONISAMIDE SERPL-MCNC: 11 UG/ML (ref 10–40)

## 2024-04-10 ENCOUNTER — OFFICE VISIT (OUTPATIENT)
Dept: NEUROLOGY | Age: 24
End: 2024-04-10

## 2024-04-10 ENCOUNTER — HOSPITAL ENCOUNTER (OUTPATIENT)
Age: 24
Discharge: HOME | End: 2024-04-10
Payer: COMMERCIAL

## 2024-04-10 VITALS
BODY MASS INDEX: 38.49 KG/M2 | WEIGHT: 276 LBS | SYSTOLIC BLOOD PRESSURE: 112 MMHG | OXYGEN SATURATION: 98 % | DIASTOLIC BLOOD PRESSURE: 78 MMHG | HEART RATE: 98 BPM

## 2024-04-10 DIAGNOSIS — F90.0 ATTENTION DEFICIT HYPERACTIVITY DISORDER (ADHD), PREDOMINANTLY INATTENTIVE TYPE: ICD-10-CM

## 2024-04-10 DIAGNOSIS — G40.909 SEIZURE DISORDER (HCC): ICD-10-CM

## 2024-04-10 DIAGNOSIS — G40.209 PARTIAL SYMPTOMATIC EPILEPSY WITH COMPLEX PARTIAL SEIZURES, NOT INTRACTABLE, WITHOUT STATUS EPILEPTICUS (HCC): ICD-10-CM

## 2024-04-10 DIAGNOSIS — G40.909 SEIZURE DISORDER (HCC): Primary | ICD-10-CM

## 2024-04-10 DIAGNOSIS — F81.9 LEARNING DIFFICULTY: ICD-10-CM

## 2024-04-10 DIAGNOSIS — R94.01 ABNORMAL EEG: ICD-10-CM

## 2024-04-10 DIAGNOSIS — F98.9 BEHAVIORAL AND EMOTIONAL DISORDER WITH ONSET IN CHILDHOOD: ICD-10-CM

## 2024-04-10 DIAGNOSIS — G40.409 OTHER GENERALIZED EPILEPSY, NOT INTRACTABLE, WITHOUT STATUS EPILEPTICUS (HCC): ICD-10-CM

## 2024-04-10 DIAGNOSIS — R56.00 FEBRILE SEIZURES (HCC): ICD-10-CM

## 2024-04-10 DIAGNOSIS — G40.409 TONIC CLONIC SEIZURES (HCC): ICD-10-CM

## 2024-04-10 LAB
ERYTHROCYTE [DISTWIDTH] IN BLOOD BY AUTOMATED COUNT: 13.3 % (ref 11.8–14.4)
HCT VFR BLD AUTO: 46.2 % (ref 40.7–50.3)
HGB BLD-MCNC: 14.8 G/DL (ref 13–17)
MCH RBC QN AUTO: 28.9 PG (ref 25.2–33.5)
MCHC RBC AUTO-ENTMCNC: 32 G/DL (ref 25.2–33.5)
MCV RBC AUTO: 90.2 FL (ref 82.6–102.9)
NRBC BLD-RTO: 0 PER 100 WBC
PLATELET # BLD AUTO: 276 K/UL (ref 138–453)
PMV BLD AUTO: 10.2 FL (ref 8.1–13.5)
RBC # BLD AUTO: 5.12 M/UL (ref 4.21–5.77)
WBC OTHER # BLD: 7.7 K/UL (ref 3.5–11.3)

## 2024-04-10 PROCEDURE — 80203 DRUG SCREEN QUANT ZONISAMIDE: CPT

## 2024-04-10 PROCEDURE — 36415 COLL VENOUS BLD VENIPUNCTURE: CPT

## 2024-04-10 PROCEDURE — 85027 COMPLETE CBC AUTOMATED: CPT

## 2024-04-10 PROCEDURE — 80164 ASSAY DIPROPYLACETIC ACD TOT: CPT

## 2024-04-10 PROCEDURE — 80051 ELECTROLYTE PANEL: CPT

## 2024-04-10 RX ORDER — DIVALPROEX SODIUM 500 MG/1
TABLET, DELAYED RELEASE ORAL
Qty: 60 TABLET | Refills: 5 | Status: SHIPPED | OUTPATIENT
Start: 2024-04-10

## 2024-04-10 RX ORDER — ZONISAMIDE 100 MG/1
100 CAPSULE ORAL 2 TIMES DAILY
Qty: 60 CAPSULE | Refills: 5 | Status: SHIPPED | OUTPATIENT
Start: 2024-04-10

## 2024-04-10 ASSESSMENT — ENCOUNTER SYMPTOMS
DIARRHEA: 0
RESPIRATORY NEGATIVE: 1
ALLERGIC/IMMUNOLOGIC NEGATIVE: 1
EYES NEGATIVE: 1
GASTROINTESTINAL NEGATIVE: 1

## 2024-04-10 NOTE — PROGRESS NOTES
PROBLEM  LIST   FOR  PATIENT  CARE   COORDINATION             Past Medical History:   Diagnosis Date    Abnormal EEG     Absence seizure (HCC)     Attention deficit hyperactivity disorder     Behavioral problems     Complex partial epileptic seizure (HCC)     Epilepsy (HCC)     Febrile seizures (HCC) last seizure in July 2010    Learning difficulty     Tonic clonic seizures (HCC)                   Past Surgical History:   Procedure Laterality Date    CYST REMOVAL  04/2021    Tailbone    TONSILLECTOMY AND ADENOIDECTOMY  2003                 Current Outpatient Medications   Medication Sig Dispense Refill    divalproex (DEPAKOTE) 500 MG DR tablet ONE TABLET     TWICE  DAILY 60 tablet 5    zonisamide (ZONEGRAN) 100 MG capsule Take 1 capsule by mouth 2 times daily 60 capsule 5    vitamin D (CHOLECALCIFEROL) 25 MCG (1000 UT) TABS tablet Take 1 tablet by mouth daily      Multiple Vitamins-Minerals (MULTIVITAL) CHEW Take  by mouth daily.      loratadine (CLARITIN) 10 MG tablet Take 1 tablet by mouth as needed       No current facility-administered medications for this visit.           No Known Allergies            Family History   Problem Relation Age of Onset    Depression Mother     Asthma Mother     Neuropathy Mother     Mental Illness Mother     Seizures Neg Hx              Social History     Socioeconomic History    Marital status: Single     Spouse name: Not on file    Number of children: Not on file    Years of education: Not on file    Highest education level: Not on file   Occupational History    Not on file   Tobacco Use    Smoking status: Never    Smokeless tobacco: Never   Vaping Use    Vaping Use: Never used   Substance and Sexual Activity    Alcohol use: No     Alcohol/week: 0.0 standard drinks of alcohol    Drug use: Never    Sexual activity: Not on file   Other Topics Concern    Not on file   Social History Narrative    Not on file     Social Determinants of Health     Financial Resource Strain: Not on file

## 2024-04-10 NOTE — PATIENT INSTRUCTIONS
Monitoring  Of         Any  Medical  Conditions  Including   Elevated  Blood  Pressure,  Lipid  Profile,       Blood  Sugar levels  And   Heart  Disease.                *   Period   Screening  For  Cancers  Involving  Breast,  Colon,         Lungs  And  Other  Organs  As  Applicable,           In consultation   With  Your  Primary Care Providers.                *  If   There is  Any  Significant  Worsening   Of  Current  Symptoms  And             Or  If    Any additional  New  Neurological  Symptoms  and          Significant  Concerns   Should  Call  911 or  Go  To  Emergency  Department            For  Further  Immediate  Evaluation.

## 2024-04-11 LAB
ANION GAP SERPL CALCULATED.3IONS-SCNC: 11 MMOL/L (ref 9–17)
CHLORIDE SERPL-SCNC: 108 MMOL/L (ref 98–107)
CO2 SERPL-SCNC: 25 MMOL/L (ref 20–31)
DATE LAST DOSE: 4
POTASSIUM SERPL-SCNC: 4.7 MMOL/L (ref 3.7–5.3)
SODIUM SERPL-SCNC: 144 MMOL/L (ref 135–144)
TME LAST DOSE: 1015 H
VALPROATE SERPL-MCNC: 51 UG/ML (ref 50–125)
VANCOMYCIN DOSE: 200 MG

## 2024-04-12 LAB — ZONISAMIDE SERPL-MCNC: 10 UG/ML (ref 10–40)

## 2024-09-27 ENCOUNTER — HOSPITAL ENCOUNTER (OUTPATIENT)
Age: 24
Discharge: HOME | End: 2024-09-27

## 2024-09-27 LAB
HBV SURFACE AB SERPL IA-ACNC: <3.5 MIU/ML
RUBV IGG SERPL QL IA: 68.7 IU/ML

## 2024-09-27 PROCEDURE — 86317 IMMUNOASSAY INFECTIOUS AGENT: CPT

## 2024-09-27 PROCEDURE — 36415 COLL VENOUS BLD VENIPUNCTURE: CPT

## 2024-09-27 PROCEDURE — 86762 RUBELLA ANTIBODY: CPT

## 2024-09-27 PROCEDURE — 86765 RUBEOLA ANTIBODY: CPT

## 2024-09-27 PROCEDURE — 86787 VARICELLA-ZOSTER ANTIBODY: CPT

## 2024-09-27 PROCEDURE — 86735 MUMPS ANTIBODY: CPT

## 2024-09-30 LAB
MEV IGG SER-ACNC: 5.49
MUV IGG SER IA-ACNC: 3.84
VZV IGG SER QL IA: 2.71

## 2024-10-10 ENCOUNTER — OFFICE VISIT (OUTPATIENT)
Dept: NEUROLOGY | Age: 24
End: 2024-10-10

## 2024-10-10 ENCOUNTER — HOSPITAL ENCOUNTER (OUTPATIENT)
Age: 24
Discharge: HOME | End: 2024-10-10
Payer: COMMERCIAL

## 2024-10-10 VITALS
HEART RATE: 97 BPM | RESPIRATION RATE: 16 BRPM | OXYGEN SATURATION: 97 % | WEIGHT: 284 LBS | SYSTOLIC BLOOD PRESSURE: 120 MMHG | DIASTOLIC BLOOD PRESSURE: 80 MMHG | BODY MASS INDEX: 40.66 KG/M2 | HEIGHT: 70 IN

## 2024-10-10 DIAGNOSIS — G40.909 SEIZURE DISORDER (HCC): ICD-10-CM

## 2024-10-10 DIAGNOSIS — R56.00 FEBRILE SEIZURES (HCC): ICD-10-CM

## 2024-10-10 DIAGNOSIS — R94.01 ABNORMAL EEG: ICD-10-CM

## 2024-10-10 DIAGNOSIS — G40.409 OTHER GENERALIZED EPILEPSY, NOT INTRACTABLE, WITHOUT STATUS EPILEPTICUS (HCC): Primary | ICD-10-CM

## 2024-10-10 DIAGNOSIS — G40.409 OTHER GENERALIZED EPILEPSY, NOT INTRACTABLE, WITHOUT STATUS EPILEPTICUS (HCC): ICD-10-CM

## 2024-10-10 DIAGNOSIS — G40.209 PARTIAL SYMPTOMATIC EPILEPSY WITH COMPLEX PARTIAL SEIZURES, NOT INTRACTABLE, WITHOUT STATUS EPILEPTICUS (HCC): ICD-10-CM

## 2024-10-10 DIAGNOSIS — F81.9 LEARNING DIFFICULTY: ICD-10-CM

## 2024-10-10 DIAGNOSIS — F90.0 ATTENTION DEFICIT HYPERACTIVITY DISORDER (ADHD), PREDOMINANTLY INATTENTIVE TYPE: ICD-10-CM

## 2024-10-10 DIAGNOSIS — F98.9 BEHAVIORAL AND EMOTIONAL DISORDER WITH ONSET IN CHILDHOOD: ICD-10-CM

## 2024-10-10 LAB
ANION GAP SERPL CALCULATED.3IONS-SCNC: 9 MMOL/L (ref 9–17)
CHLORIDE SERPL-SCNC: 107 MMOL/L (ref 98–107)
CO2 SERPL-SCNC: 26 MMOL/L (ref 20–31)
ERYTHROCYTE [DISTWIDTH] IN BLOOD BY AUTOMATED COUNT: 13.9 % (ref 11.8–14.4)
FOLATE SERPL-MCNC: >20 NG/ML (ref 4.8–24.2)
HCT VFR BLD AUTO: 43.9 % (ref 40.7–50.3)
HGB BLD-MCNC: 14.6 G/DL (ref 13–17)
MCH RBC QN AUTO: 29.2 PG (ref 25.2–33.5)
MCHC RBC AUTO-ENTMCNC: 33.3 G/DL (ref 25.2–33.5)
MCV RBC AUTO: 87.8 FL (ref 82.6–102.9)
NRBC BLD-RTO: 0 PER 100 WBC
PLATELET # BLD AUTO: 291 K/UL (ref 138–453)
PMV BLD AUTO: 10.3 FL (ref 8.1–13.5)
POTASSIUM SERPL-SCNC: 4.6 MMOL/L (ref 3.7–5.3)
RBC # BLD AUTO: 5 M/UL (ref 4.21–5.77)
SODIUM SERPL-SCNC: 142 MMOL/L (ref 135–144)
TSH SERPL DL<=0.05 MIU/L-ACNC: 2.75 UIU/ML (ref 0.3–5)
VALPROATE SERPL-MCNC: 80 UG/ML (ref 50–125)
VIT B12 SERPL-MCNC: 1179 PG/ML (ref 232–1245)
WBC OTHER # BLD: 9 K/UL (ref 3.5–11.3)

## 2024-10-10 PROCEDURE — 82746 ASSAY OF FOLIC ACID SERUM: CPT

## 2024-10-10 PROCEDURE — 36415 COLL VENOUS BLD VENIPUNCTURE: CPT

## 2024-10-10 PROCEDURE — 84443 ASSAY THYROID STIM HORMONE: CPT

## 2024-10-10 PROCEDURE — 80164 ASSAY DIPROPYLACETIC ACD TOT: CPT

## 2024-10-10 PROCEDURE — 85027 COMPLETE CBC AUTOMATED: CPT

## 2024-10-10 PROCEDURE — 80051 ELECTROLYTE PANEL: CPT

## 2024-10-10 PROCEDURE — 82607 VITAMIN B-12: CPT

## 2024-10-10 PROCEDURE — 80203 DRUG SCREEN QUANT ZONISAMIDE: CPT

## 2024-10-10 RX ORDER — ZONISAMIDE 100 MG/1
100 CAPSULE ORAL 2 TIMES DAILY
Qty: 60 CAPSULE | Refills: 5 | Status: SHIPPED | OUTPATIENT
Start: 2024-10-10

## 2024-10-10 RX ORDER — DIVALPROEX SODIUM 500 MG/1
TABLET, DELAYED RELEASE ORAL
Qty: 60 TABLET | Refills: 5 | Status: SHIPPED | OUTPATIENT
Start: 2024-10-10

## 2024-10-10 ASSESSMENT — PATIENT HEALTH QUESTIONNAIRE - PHQ9
2. FEELING DOWN, DEPRESSED OR HOPELESS: NOT AT ALL
SUM OF ALL RESPONSES TO PHQ QUESTIONS 1-9: 0
SUM OF ALL RESPONSES TO PHQ9 QUESTIONS 1 & 2: 0
1. LITTLE INTEREST OR PLEASURE IN DOING THINGS: NOT AT ALL
SUM OF ALL RESPONSES TO PHQ QUESTIONS 1-9: 0

## 2024-10-10 ASSESSMENT — ENCOUNTER SYMPTOMS
VISUAL CHANGE: 0
RESPIRATORY NEGATIVE: 1

## 2024-10-10 NOTE — PATIENT INSTRUCTIONS
*   SEIZURE  PRECAUTIONS.                 A)  Avoid  Working  At   Heights.          B)  Avoid  Working  With  Heavy machinery.          C)  Avoid   Swimming,  Climbing  A  Ladder   Unattended.          D)  Avoid   Driving   If  You   Have  A  Seizure.          E)  Must   Be  Seizure  Free   For  At   Least   6 months,  Before   You  Can drive.                                                                                                                                    F) Some times  Your  May  Feel  Seizure coming  Before  It  Begins.  You  May feel             Strange smell or funny  Feeling  In  Your  Stomach,  Which is  Called   Aura.                     TIPS  TO  REDUCE/ PREVENT  SEIZURES         1.  Take  Your  Anti seizure  Medications   As   Recommended.       2. Get   Enough   Sleep.   Sleep  Deprivation   Can  Trigger  A  Seizure.       3. If   You   Have  A fever,  Treat  It  At  Once,  And  Contact   Your  Primary  Care Providers.                                                                                                                           4. Avoid   Alcohol.       5. Avoid  Flashing  Lights,  Loud  Noises and  TV  And  Video  Games,           As   These  May  Trigger   Your  Seizures       6.  Control  Your  Stress  And   Have  Adequate  Rest.       7.   If  You  Feel  A  Seizure  Coming   On :           A) warn people  Who  Are  With  You           B)  Make  Sure  There  Are  No  Sharp or  Hard  Objects  Around you.           C)  Lay down  On  Your  Side  And  Relax.                 *   ADEQUATE   FLUID  INTAKE   AND  ELECTROLYTE  BALANCE             * KEEP  DAIRY  OF   THE  NEUROLOGICAL  SYMPTOMS          *  TO  MAINTAIN  REGULAR  SLEEP  WAKE  CYCLES.     *   TO  HAVE  ADEQUATE  REST  AND   SLEEP    HOURS.          *    AVOID  USAGE OF   TOBACCO,  EXCESSIVE  ALCOHOL                AND   ILLEGAL   SUBSTANCES,  IF  ANY          *  Maintain   Healthy  Life Style    With   Periodic

## 2024-10-10 NOTE — PROGRESS NOTES
Subjective:        Patient ID: Tim Childs is a 23 y.o.   RIGHT   HANDED   .      Neurologic Problem  The patient's primary symptoms include memory loss. The patient's pertinent negatives include no altered mental status, clumsiness, focal sensory loss, near-syncope, slurred speech, syncope, visual change or weakness. Primary symptoms comment: KNOWN   HISTORY OF   CHILDHOOD  EPILESY,  H/O  ADHD   LEARNING  DIFFICUTIES. This is a chronic problem. The neurological problem developed suddenly. Progression since onset: WELL  CONTROLLED     WITH  ANTI EPILEPTIC MEDICATIONSS. Past treatments include bed rest, medication and sleep (DEPAKOTE,  ZONEGRAN). The treatment provided significant relief. His past medical history is significant for seizures. There is no history of a bleeding disorder, a clotting disorder, a CVA, dementia, head trauma or liver disease.             History obtained from  The patient and  HIS   AUNT  (SISTER)           and other  available medical records were  Also  reviewed.              1)      H/O  KNOWN  CHILDHOOD ONSET  EPILEPSY                 FIRST    SEIZURE    REPORTED   TO   BE  AT   THE  AGE                        OF   2 1/2   YEARS  OLD    AS  PER  HIS  MOTHER                               -   STABLE          2)       PREVIOUS    H/O    FEBRILE SEIZURES                       -  NO  RECURRENCE                3)        PATIENT'S   PREVIOUS    SEIZURE  TYPES  INCLUDE :                  GTC  SEIZURES,  STARING  EPISODES,                 ABSENCE  AND  PARTIAL  COMPLEX  SEIZURES                        -     WELL  CONTROLLED                4)       FAMILY   H/O  SEIZURE  DISORDER/  EPILEPSY                       -  BROTHER              5)       PREVIOUS     H/O  ADHD                         -   STABLE               PATIENT  TO  FOLLOW  WITH  MENTAL  HEALTH  PROFESSIONALS              6)        KNOWN     H/O   LEARNING  DIFFICULTY                      -  IMPROVED                    PATIENT

## 2024-10-12 LAB — ZONISAMIDE SERPL-MCNC: 8 UG/ML (ref 10–40)

## 2025-04-21 DIAGNOSIS — G40.909 SEIZURE DISORDER (HCC): ICD-10-CM

## 2025-04-21 NOTE — TELEPHONE ENCOUNTER
Last Appt. 10/10/2024  Next Appt. 05/15/2025    POA called requesting refills for depakote and zonegran sent to [facility]

## 2025-04-22 RX ORDER — DIVALPROEX SODIUM 500 MG/1
TABLET, DELAYED RELEASE ORAL
Qty: 60 TABLET | Refills: 5 | Status: SHIPPED | OUTPATIENT
Start: 2025-04-22

## 2025-04-22 RX ORDER — ZONISAMIDE 100 MG/1
100 CAPSULE ORAL 2 TIMES DAILY
Qty: 60 CAPSULE | Refills: 5 | Status: SHIPPED | OUTPATIENT
Start: 2025-04-22

## 2025-04-27 DIAGNOSIS — G40.909 SEIZURE DISORDER (HCC): ICD-10-CM

## 2025-05-06 RX ORDER — ZONISAMIDE 100 MG/1
CAPSULE ORAL
Qty: 60 CAPSULE | Refills: 5 | OUTPATIENT
Start: 2025-05-06

## 2025-05-15 ENCOUNTER — HOSPITAL ENCOUNTER (OUTPATIENT)
Age: 25
Discharge: HOME | End: 2025-05-15
Payer: COMMERCIAL

## 2025-05-15 ENCOUNTER — OFFICE VISIT (OUTPATIENT)
Dept: NEUROLOGY | Age: 25
End: 2025-05-15
Payer: COMMERCIAL

## 2025-05-15 VITALS
RESPIRATION RATE: 16 BRPM | BODY MASS INDEX: 44.02 KG/M2 | SYSTOLIC BLOOD PRESSURE: 120 MMHG | OXYGEN SATURATION: 96 % | DIASTOLIC BLOOD PRESSURE: 80 MMHG | WEIGHT: 306.8 LBS | HEART RATE: 90 BPM

## 2025-05-15 DIAGNOSIS — F90.0 ATTENTION DEFICIT HYPERACTIVITY DISORDER (ADHD), PREDOMINANTLY INATTENTIVE TYPE: ICD-10-CM

## 2025-05-15 DIAGNOSIS — G40.909 SEIZURE DISORDER (HCC): ICD-10-CM

## 2025-05-15 DIAGNOSIS — G40.409 OTHER GENERALIZED EPILEPSY, NOT INTRACTABLE, WITHOUT STATUS EPILEPTICUS (HCC): ICD-10-CM

## 2025-05-15 DIAGNOSIS — R56.00 FEBRILE SEIZURES (HCC): ICD-10-CM

## 2025-05-15 DIAGNOSIS — G40.909 SEIZURE DISORDER (HCC): Primary | ICD-10-CM

## 2025-05-15 DIAGNOSIS — G40.209 PARTIAL SYMPTOMATIC EPILEPSY WITH COMPLEX PARTIAL SEIZURES, NOT INTRACTABLE, WITHOUT STATUS EPILEPTICUS (HCC): ICD-10-CM

## 2025-05-15 DIAGNOSIS — F81.9 LEARNING DIFFICULTY: ICD-10-CM

## 2025-05-15 DIAGNOSIS — R94.01 ABNORMAL EEG: ICD-10-CM

## 2025-05-15 DIAGNOSIS — F98.9 BEHAVIORAL AND EMOTIONAL DISORDER WITH ONSET IN CHILDHOOD: ICD-10-CM

## 2025-05-15 LAB
ANION GAP SERPL CALCULATED.3IONS-SCNC: 11 MMOL/L (ref 9–16)
CHLORIDE SERPL-SCNC: 106 MMOL/L (ref 98–107)
CO2 SERPL-SCNC: 24 MMOL/L (ref 20–31)
ERYTHROCYTE [DISTWIDTH] IN BLOOD BY AUTOMATED COUNT: 13.9 % (ref 11.8–14.4)
HCT VFR BLD AUTO: 43 % (ref 40.7–50.3)
HGB BLD-MCNC: 14.4 G/DL (ref 13–17)
MCH RBC QN AUTO: 28.8 PG (ref 25.2–33.5)
MCHC RBC AUTO-ENTMCNC: 33.5 G/DL (ref 25.2–33.5)
MCV RBC AUTO: 86 FL (ref 82.6–102.9)
NRBC BLD-RTO: 0 PER 100 WBC
PLATELET # BLD AUTO: 282 K/UL (ref 138–453)
PMV BLD AUTO: 9.9 FL (ref 8.1–13.5)
POTASSIUM SERPL-SCNC: 4.8 MMOL/L (ref 3.7–5.3)
RBC # BLD AUTO: 5 M/UL (ref 4.21–5.77)
SODIUM SERPL-SCNC: 141 MMOL/L (ref 136–145)
VALPROATE SERPL-MCNC: 31 UG/ML (ref 50–125)
WBC OTHER # BLD: 10.3 K/UL (ref 3.5–11.3)

## 2025-05-15 PROCEDURE — G8417 CALC BMI ABV UP PARAM F/U: HCPCS | Performed by: PSYCHIATRY & NEUROLOGY

## 2025-05-15 PROCEDURE — 80164 ASSAY DIPROPYLACETIC ACD TOT: CPT

## 2025-05-15 PROCEDURE — 36415 COLL VENOUS BLD VENIPUNCTURE: CPT

## 2025-05-15 PROCEDURE — 99214 OFFICE O/P EST MOD 30 MIN: CPT | Performed by: PSYCHIATRY & NEUROLOGY

## 2025-05-15 PROCEDURE — 80203 DRUG SCREEN QUANT ZONISAMIDE: CPT

## 2025-05-15 PROCEDURE — 85027 COMPLETE CBC AUTOMATED: CPT

## 2025-05-15 PROCEDURE — G8427 DOCREV CUR MEDS BY ELIG CLIN: HCPCS | Performed by: PSYCHIATRY & NEUROLOGY

## 2025-05-15 PROCEDURE — 1036F TOBACCO NON-USER: CPT | Performed by: PSYCHIATRY & NEUROLOGY

## 2025-05-15 PROCEDURE — 80051 ELECTROLYTE PANEL: CPT

## 2025-05-15 RX ORDER — FLUTICASONE PROPIONATE 50 MCG
2 SPRAY, SUSPENSION (ML) NASAL DAILY
COMMUNITY
Start: 2024-12-24 | End: 2025-12-24

## 2025-05-15 RX ORDER — DIVALPROEX SODIUM 500 MG/1
TABLET, DELAYED RELEASE ORAL
Qty: 60 TABLET | Refills: 5 | Status: SHIPPED | OUTPATIENT
Start: 2025-05-15

## 2025-05-15 RX ORDER — METRONIDAZOLE 10 MG/G
GEL TOPICAL
COMMUNITY
Start: 2025-02-12

## 2025-05-15 RX ORDER — ZONISAMIDE 100 MG/1
100 CAPSULE ORAL 2 TIMES DAILY
Qty: 60 CAPSULE | Refills: 5 | Status: SHIPPED | OUTPATIENT
Start: 2025-05-15

## 2025-05-15 ASSESSMENT — ENCOUNTER SYMPTOMS: RESPIRATORY NEGATIVE: 1

## 2025-05-15 ASSESSMENT — PATIENT HEALTH QUESTIONNAIRE - PHQ9
SUM OF ALL RESPONSES TO PHQ QUESTIONS 1-9: 0
2. FEELING DOWN, DEPRESSED OR HOPELESS: NOT AT ALL
SUM OF ALL RESPONSES TO PHQ QUESTIONS 1-9: 0
1. LITTLE INTEREST OR PLEASURE IN DOING THINGS: NOT AT ALL

## 2025-05-15 NOTE — PROGRESS NOTES
Subjective:        Patient ID: Tim Childs is a 24 y.o.   RIGHT   HANDED   .      Neurologic Problem  The patient's pertinent negatives include no clumsiness, focal sensory loss or slurred speech. Primary symptoms comment: KNOWN   HISTORY OF   CHILDHOOD  EPILESY,  H/O  ADHD   LEARNING  DIFFICUTIES. This is a chronic problem. The neurological problem developed suddenly. Progression since onset: WELL  CONTROLLED     WITH  ANTI EPILEPTIC MEDICATIONSS. Past treatments include bed rest, medication and sleep (DEPAKOTE,  ZONEGRAN). The treatment provided significant relief. His past medical history is significant for seizures. There is no history of a bleeding disorder, a clotting disorder, a CVA, dementia, head trauma or liver disease.             History obtained from  The patient and  HIS   MOM          and other  available medical records were  Also  reviewed.              1)      H/O  KNOWN  CHILDHOOD ONSET  EPILEPSY                 FIRST    SEIZURE    REPORTED   TO   BE  AT   THE  AGE                        OF   2 1/2   YEARS  OLD    AS  PER  HIS  MOTHER                               -   STABLE          2)       PREVIOUS    H/O    FEBRILE SEIZURES                       -  NO  RECURRENCE                3)        PATIENT'S   PREVIOUS    SEIZURE  TYPES  INCLUDE :                  GTC  SEIZURES,  STARING  EPISODES,                 ABSENCE  AND  PARTIAL  COMPLEX  SEIZURES                        -     WELL  CONTROLLED                4)       FAMILY   H/O  SEIZURE  DISORDER/  EPILEPSY                       -  BROTHER              5)       PREVIOUS     H/O  ADHD                         -   STABLE               PATIENT  TO  FOLLOW  WITH  MENTAL  HEALTH  PROFESSIONALS              6)        KNOWN     H/O   LEARNING  DIFFICULTY                      -  IMPROVED                    PATIENT   COMPLETED       12  TH  GRADE                         WITH       VOCATIONAL  EDUCATION            7)       PREVIOUS  H/O  BEHAVIORAL

## 2025-05-18 LAB — ZONISAMIDE SERPL-MCNC: 7 UG/ML (ref 10–40)
